# Patient Record
Sex: MALE | Race: WHITE | NOT HISPANIC OR LATINO | Employment: UNEMPLOYED | URBAN - NONMETROPOLITAN AREA
[De-identification: names, ages, dates, MRNs, and addresses within clinical notes are randomized per-mention and may not be internally consistent; named-entity substitution may affect disease eponyms.]

---

## 2018-04-10 ENCOUNTER — HOSPITAL ENCOUNTER (OUTPATIENT)
Dept: GENERAL RADIOLOGY | Facility: HOSPITAL | Age: 63
Discharge: HOME | End: 2018-04-10
Attending: INTERNAL MEDICINE | Admitting: INTERNAL MEDICINE

## 2018-04-10 ENCOUNTER — TRANSCRIBE ORDERS (OUTPATIENT)
Dept: GENERAL RADIOLOGY | Facility: HOSPITAL | Age: 63
End: 2018-04-10

## 2018-04-10 DIAGNOSIS — M25.562 LEFT KNEE PAIN, UNSPECIFIED CHRONICITY: Primary | ICD-10-CM

## 2018-04-10 PROCEDURE — 73560 X-RAY EXAM OF KNEE 1 OR 2: CPT | Performed by: RADIOLOGY

## 2018-04-10 PROCEDURE — 73560 X-RAY EXAM OF KNEE 1 OR 2: CPT

## 2018-08-03 ENCOUNTER — TRANSCRIBE ORDERS (OUTPATIENT)
Dept: MRI IMAGING | Facility: HOSPITAL | Age: 63
End: 2018-08-03

## 2018-08-03 DIAGNOSIS — M25.562 LEFT KNEE PAIN, UNSPECIFIED CHRONICITY: Primary | ICD-10-CM

## 2019-07-05 ENCOUNTER — APPOINTMENT (OUTPATIENT)
Dept: CT IMAGING | Facility: HOSPITAL | Age: 64
End: 2019-07-05

## 2019-07-05 ENCOUNTER — APPOINTMENT (OUTPATIENT)
Dept: GENERAL RADIOLOGY | Facility: HOSPITAL | Age: 64
End: 2019-07-05

## 2019-07-05 ENCOUNTER — HOSPITAL ENCOUNTER (INPATIENT)
Facility: HOSPITAL | Age: 64
LOS: 3 days | Discharge: HOME | End: 2019-07-08
Attending: EMERGENCY MEDICINE | Admitting: INTERNAL MEDICINE

## 2019-07-05 DIAGNOSIS — A48.1 LEGIONELLA PNEUMONIA (HCC): ICD-10-CM

## 2019-07-05 DIAGNOSIS — J18.9 PNEUMONIA OF BOTH LUNGS DUE TO INFECTIOUS ORGANISM, UNSPECIFIED PART OF LUNG: Primary | ICD-10-CM

## 2019-07-05 DIAGNOSIS — A41.9 SEPSIS, DUE TO UNSPECIFIED ORGANISM: ICD-10-CM

## 2019-07-05 DIAGNOSIS — J96.01 ACUTE RESPIRATORY FAILURE WITH HYPOXIA (HCC): ICD-10-CM

## 2019-07-05 DIAGNOSIS — R55 SYNCOPE AND COLLAPSE: ICD-10-CM

## 2019-07-05 PROBLEM — E03.9 HYPOTHYROIDISM: Status: ACTIVE | Noted: 2019-07-05

## 2019-07-05 PROBLEM — E11.9 TYPE 2 DIABETES MELLITUS: Status: ACTIVE | Noted: 2019-07-05

## 2019-07-05 LAB
A-A DO2: 62.8 MMHG (ref 0–300)
ALBUMIN SERPL-MCNC: 2.91 G/DL (ref 3.5–5.2)
ALBUMIN/GLOB SERPL: 0.7 G/DL
ALP SERPL-CCNC: 63 U/L (ref 39–117)
ALT SERPL W P-5'-P-CCNC: 16 U/L (ref 1–41)
ANION GAP SERPL CALCULATED.3IONS-SCNC: 13.8 MMOL/L (ref 5–15)
ARTERIAL PATENCY WRIST A: POSITIVE
AST SERPL-CCNC: 24 U/L (ref 1–40)
ATMOSPHERIC PRESS: 723 MMHG
BACTERIA UR QL AUTO: ABNORMAL /HPF
BASE EXCESS BLDA CALC-SCNC: -3.9 MMOL/L
BASOPHILS # BLD AUTO: 0.03 10*3/MM3 (ref 0–0.2)
BASOPHILS NFR BLD AUTO: 0.3 % (ref 0–1.5)
BDY SITE: ABNORMAL
BILIRUB SERPL-MCNC: 0.3 MG/DL (ref 0.2–1.2)
BILIRUB UR QL STRIP: NEGATIVE
BODY TEMPERATURE: 98.6 C
BUN BLD-MCNC: 23 MG/DL (ref 8–23)
BUN/CREAT SERPL: 12.2 (ref 7–25)
CALCIUM SPEC-SCNC: 9.4 MG/DL (ref 8.6–10.5)
CHLORIDE SERPL-SCNC: 99 MMOL/L (ref 98–107)
CK SERPL-CCNC: 113 U/L (ref 20–200)
CLARITY UR: ABNORMAL
CO2 SERPL-SCNC: 18.2 MMOL/L (ref 22–29)
COHGB MFR BLD: 1.7 % (ref 0–5)
COLOR UR: YELLOW
CREAT BLD-MCNC: 1.89 MG/DL (ref 0.76–1.27)
D-LACTATE SERPL-SCNC: 1.2 MMOL/L (ref 0.5–2)
DEPRECATED RDW RBC AUTO: 50.3 FL (ref 37–54)
EOSINOPHIL # BLD AUTO: 0.18 10*3/MM3 (ref 0–0.4)
EOSINOPHIL NFR BLD AUTO: 1.7 % (ref 0.3–6.2)
ERYTHROCYTE [DISTWIDTH] IN BLOOD BY AUTOMATED COUNT: 14.1 % (ref 12.3–15.4)
GFR SERPL CREATININE-BSD FRML MDRD: 36 ML/MIN/1.73
GLOBULIN UR ELPH-MCNC: 4.2 GM/DL
GLUCOSE BLD-MCNC: 273 MG/DL (ref 65–99)
GLUCOSE UR STRIP-MCNC: ABNORMAL MG/DL
HBA1C MFR BLD: 7.4 % (ref 4.8–5.6)
HCO3 BLDA-SCNC: 19.7 MMOL/L (ref 22–26)
HCT VFR BLD AUTO: 35.4 % (ref 37.5–51)
HCT VFR BLD CALC: 36 % (ref 42–52)
HGB BLD-MCNC: 11.6 G/DL (ref 13–17.7)
HGB BLDA-MCNC: 12.3 G/DL (ref 12–16)
HGB UR QL STRIP.AUTO: ABNORMAL
HOLD SPECIMEN: NORMAL
HOLD SPECIMEN: NORMAL
HOROWITZ INDEX BLD+IHG-RTO: 21 %
HYALINE CASTS UR QL AUTO: ABNORMAL /LPF
IMM GRANULOCYTES # BLD AUTO: 0.17 10*3/MM3 (ref 0–0.05)
IMM GRANULOCYTES NFR BLD AUTO: 1.6 % (ref 0–0.5)
KETONES UR QL STRIP: ABNORMAL
L PNEUMO1 AG UR QL IA: POSITIVE
LEUKOCYTE ESTERASE UR QL STRIP.AUTO: NEGATIVE
LYMPHOCYTES # BLD AUTO: 0.63 10*3/MM3 (ref 0.7–3.1)
LYMPHOCYTES NFR BLD AUTO: 5.8 % (ref 19.6–45.3)
MCH RBC QN AUTO: 32.9 PG (ref 26.6–33)
MCHC RBC AUTO-ENTMCNC: 32.8 G/DL (ref 31.5–35.7)
MCV RBC AUTO: 100.3 FL (ref 79–97)
METHGB BLD QL: 0.3 % (ref 0–3)
MODALITY: ABNORMAL
MONOCYTES # BLD AUTO: 0.75 10*3/MM3 (ref 0.1–0.9)
MONOCYTES NFR BLD AUTO: 6.9 % (ref 5–12)
NEUTROPHILS # BLD AUTO: 9.06 10*3/MM3 (ref 1.7–7)
NEUTROPHILS NFR BLD AUTO: 83.7 % (ref 42.7–76)
NITRITE UR QL STRIP: NEGATIVE
NOTE: ABNORMAL
NT-PROBNP SERPL-MCNC: 501.7 PG/ML (ref 5–900)
OXYHGB MFR BLDV: 77.6 % (ref 85–100)
PCO2 BLDA: 31.5 MM HG (ref 35–45)
PCO2 TEMP ADJ BLD: ABNORMAL MM HG (ref 35–48)
PH BLDA: 7.41 PH UNITS (ref 7.35–7.45)
PH UR STRIP.AUTO: 5.5 [PH] (ref 5–8)
PH, TEMP CORRECTED: ABNORMAL PH UNITS (ref 7.35–7.45)
PLATELET # BLD AUTO: 203 10*3/MM3 (ref 140–450)
PMV BLD AUTO: 10.2 FL (ref 6–12)
PO2 BLDA: 41.4 MM HG (ref 80–100)
PO2 TEMP ADJ BLD: ABNORMAL MM HG (ref 83–108)
POTASSIUM BLD-SCNC: 5.1 MMOL/L (ref 3.5–5.2)
PROT SERPL-MCNC: 7.1 G/DL (ref 6–8.5)
PROT UR QL STRIP: ABNORMAL
RBC # BLD AUTO: 3.53 10*6/MM3 (ref 4.14–5.8)
RBC # UR: ABNORMAL /HPF
REF LAB TEST METHOD: ABNORMAL
SAO2 % BLDCOA: 79.2 % (ref 90–100)
SODIUM BLD-SCNC: 131 MMOL/L (ref 136–145)
SP GR UR STRIP: 1.02 (ref 1–1.03)
SQUAMOUS #/AREA URNS HPF: ABNORMAL /HPF
TROPONIN T SERPL-MCNC: <0.01 NG/ML (ref 0–0.03)
TSH SERPL DL<=0.05 MIU/L-ACNC: 0.29 MIU/ML (ref 0.27–4.2)
UROBILINOGEN UR QL STRIP: ABNORMAL
WBC NRBC COR # BLD: 10.82 10*3/MM3 (ref 3.4–10.8)
WBC UR QL AUTO: ABNORMAL /HPF
WHOLE BLOOD HOLD SPECIMEN: NORMAL
WHOLE BLOOD HOLD SPECIMEN: NORMAL

## 2019-07-05 PROCEDURE — 25010000002 CEFTRIAXONE: Performed by: EMERGENCY MEDICINE

## 2019-07-05 PROCEDURE — 84484 ASSAY OF TROPONIN QUANT: CPT | Performed by: EMERGENCY MEDICINE

## 2019-07-05 PROCEDURE — 84443 ASSAY THYROID STIM HORMONE: CPT | Performed by: INTERNAL MEDICINE

## 2019-07-05 PROCEDURE — 70450 CT HEAD/BRAIN W/O DYE: CPT | Performed by: RADIOLOGY

## 2019-07-05 PROCEDURE — 82550 ASSAY OF CK (CPK): CPT | Performed by: EMERGENCY MEDICINE

## 2019-07-05 PROCEDURE — 82805 BLOOD GASES W/O2 SATURATION: CPT | Performed by: EMERGENCY MEDICINE

## 2019-07-05 PROCEDURE — 83880 ASSAY OF NATRIURETIC PEPTIDE: CPT | Performed by: EMERGENCY MEDICINE

## 2019-07-05 PROCEDURE — 71045 X-RAY EXAM CHEST 1 VIEW: CPT

## 2019-07-05 PROCEDURE — 87899 AGENT NOS ASSAY W/OPTIC: CPT | Performed by: EMERGENCY MEDICINE

## 2019-07-05 PROCEDURE — 70450 CT HEAD/BRAIN W/O DYE: CPT

## 2019-07-05 PROCEDURE — 93005 ELECTROCARDIOGRAM TRACING: CPT | Performed by: EMERGENCY MEDICINE

## 2019-07-05 PROCEDURE — 83050 HGB METHEMOGLOBIN QUAN: CPT | Performed by: EMERGENCY MEDICINE

## 2019-07-05 PROCEDURE — 85025 COMPLETE CBC W/AUTO DIFF WBC: CPT | Performed by: EMERGENCY MEDICINE

## 2019-07-05 PROCEDURE — 83036 HEMOGLOBIN GLYCOSYLATED A1C: CPT | Performed by: INTERNAL MEDICINE

## 2019-07-05 PROCEDURE — 81001 URINALYSIS AUTO W/SCOPE: CPT | Performed by: EMERGENCY MEDICINE

## 2019-07-05 PROCEDURE — 71045 X-RAY EXAM CHEST 1 VIEW: CPT | Performed by: RADIOLOGY

## 2019-07-05 PROCEDURE — 36600 WITHDRAWAL OF ARTERIAL BLOOD: CPT | Performed by: EMERGENCY MEDICINE

## 2019-07-05 PROCEDURE — 93010 ELECTROCARDIOGRAM REPORT: CPT | Performed by: INTERNAL MEDICINE

## 2019-07-05 PROCEDURE — 87040 BLOOD CULTURE FOR BACTERIA: CPT | Performed by: EMERGENCY MEDICINE

## 2019-07-05 PROCEDURE — 99285 EMERGENCY DEPT VISIT HI MDM: CPT

## 2019-07-05 PROCEDURE — 80053 COMPREHEN METABOLIC PANEL: CPT | Performed by: EMERGENCY MEDICINE

## 2019-07-05 PROCEDURE — 83605 ASSAY OF LACTIC ACID: CPT | Performed by: EMERGENCY MEDICINE

## 2019-07-05 PROCEDURE — 25010000002 AZITHROMYCIN: Performed by: EMERGENCY MEDICINE

## 2019-07-05 PROCEDURE — 82375 ASSAY CARBOXYHB QUANT: CPT | Performed by: EMERGENCY MEDICINE

## 2019-07-05 RX ORDER — DEXTROSE MONOHYDRATE 25 G/50ML
25 INJECTION, SOLUTION INTRAVENOUS
Status: DISCONTINUED | OUTPATIENT
Start: 2019-07-05 | End: 2019-07-08 | Stop reason: HOSPADM

## 2019-07-05 RX ORDER — ACETAMINOPHEN 500 MG
1000 TABLET ORAL ONCE
Status: COMPLETED | OUTPATIENT
Start: 2019-07-05 | End: 2019-07-05

## 2019-07-05 RX ORDER — HYDROCODONE BITARTRATE AND ACETAMINOPHEN 10; 325 MG/1; MG/1
1 TABLET ORAL EVERY 12 HOURS PRN
COMMUNITY

## 2019-07-05 RX ORDER — SODIUM CHLORIDE 0.9 % (FLUSH) 0.9 %
10 SYRINGE (ML) INJECTION AS NEEDED
Status: DISCONTINUED | OUTPATIENT
Start: 2019-07-05 | End: 2019-07-08 | Stop reason: HOSPADM

## 2019-07-05 RX ORDER — LEVOTHYROXINE SODIUM 0.15 MG/1
150 TABLET ORAL DAILY
COMMUNITY

## 2019-07-05 RX ORDER — NICOTINE POLACRILEX 4 MG
15 LOZENGE BUCCAL
Status: DISCONTINUED | OUTPATIENT
Start: 2019-07-05 | End: 2019-07-08 | Stop reason: HOSPADM

## 2019-07-05 RX ORDER — PIOGLITAZONEHYDROCHLORIDE 30 MG/1
30 TABLET ORAL DAILY
COMMUNITY
End: 2019-07-08 | Stop reason: HOSPADM

## 2019-07-05 RX ORDER — CHOLECALCIFEROL (VITAMIN D3) 125 MCG
2000 CAPSULE ORAL DAILY
COMMUNITY
End: 2020-03-25

## 2019-07-05 RX ADMIN — ACETAMINOPHEN 1000 MG: 500 TABLET ORAL at 18:59

## 2019-07-05 RX ADMIN — SODIUM CHLORIDE 1000 ML: 9 INJECTION, SOLUTION INTRAVENOUS at 18:59

## 2019-07-05 RX ADMIN — CEFTRIAXONE 1 G: 1 INJECTION, POWDER, FOR SOLUTION INTRAMUSCULAR; INTRAVENOUS at 19:35

## 2019-07-05 RX ADMIN — AZITHROMYCIN MONOHYDRATE 500 MG: 500 INJECTION, POWDER, LYOPHILIZED, FOR SOLUTION INTRAVENOUS at 20:57

## 2019-07-05 RX ADMIN — SODIUM CHLORIDE 1000 ML: 9 INJECTION, SOLUTION INTRAVENOUS at 19:35

## 2019-07-05 NOTE — ED PROVIDER NOTES
"Subjective   63-year-old male presents with fever and a syncopal event.  He states he has been feeling \"sick\" all week with fluctuating fevers.  He has had some mild cough but otherwise has just felt fatigued.  He denies chest pain or shortness of breath.  Apparently he was sitting on the couch and had a brief syncopal event.  No seizure activity.  Came to quickly and was transported to the ER.  On arrival he was markedly febrile and hypoxic.        History provided by:  Patient and friend   used: No        Review of Systems   Constitutional: Positive for fever.   HENT: Negative.    Eyes: Negative.    Respiratory: Positive for cough. Negative for shortness of breath.    Cardiovascular: Negative.  Negative for chest pain.   Gastrointestinal: Negative.  Negative for abdominal pain.   Genitourinary: Negative.  Negative for dysuria.   Musculoskeletal: Negative.    Skin: Negative.    Neurological: Positive for syncope. Negative for headaches.   Psychiatric/Behavioral: Negative.    All other systems reviewed and are negative.      No past medical history on file.    No Known Allergies    No past surgical history on file.    No family history on file.    Social History     Socioeconomic History   • Marital status:      Spouse name: Not on file   • Number of children: Not on file   • Years of education: Not on file   • Highest education level: Not on file           Objective   Physical Exam   Constitutional: He is oriented to person, place, and time. He appears well-developed and well-nourished. He appears distressed.   HENT:   Head: Normocephalic and atraumatic.   Right Ear: External ear normal.   Left Ear: External ear normal.   Nose: Nose normal.   Eyes: Conjunctivae and EOM are normal. Pupils are equal, round, and reactive to light.   Neck: Normal range of motion. Neck supple. No JVD present. No tracheal deviation present.   Cardiovascular: Regular rhythm, normal heart sounds and intact " distal pulses. Tachycardia present.   No murmur heard.  Pulmonary/Chest: Effort normal. No respiratory distress. He has no wheezes.   Coarse, diminished on the left   Abdominal: Soft. Bowel sounds are normal. There is no tenderness.   Musculoskeletal: Normal range of motion. He exhibits no edema or deformity.   Neurological: He is alert and oriented to person, place, and time. He has normal strength. No cranial nerve deficit or sensory deficit. Coordination normal.   Skin: Skin is warm and dry. No rash noted. No erythema. No pallor.   Psychiatric: He has a normal mood and affect. His behavior is normal. Thought content normal.   Nursing note and vitals reviewed.      Procedures         EKG - 118, sinus tachy, nl intervals, no ischemic changes  Chest x-ray with diffuse bilateral hazy infiltrates consistent with pneumonia on my read    ED Course                  MDM  Number of Diagnoses or Management Options  Acute respiratory failure with hypoxia (CMS/HCC):   Legionella pneumonia (CMS/HCC):   Pneumonia of both lungs due to infectious organism, unspecified part of lung:   Sepsis, due to unspecified organism (CMS/HCC):   Syncope and collapse:   Diagnosis management comments: 63-year-old male presents complaining of fever, cough, weakness.  Found to be septic from Legionella pneumonia.  He has had improvement with IV fluids and antipyretics.  He was hypoxic but has improved on 4L by NC, satting in the low 90s now.  Breathing comfortably.  He has been treated for community-acquired pneumonia with Rocephin and azithromycin.  Interestingly his Legionella urinary antigen returned positive so this probably explains his symptoms.  He will certainly require admission.  Discussed with hospitalist and accepted to their service.       Amount and/or Complexity of Data Reviewed  Clinical lab tests: ordered and reviewed  Tests in the radiology section of CPT®: ordered and reviewed  Review and summarize past medical records:  yes  Independent visualization of images, tracings, or specimens: yes          Final diagnoses:   Pneumonia of both lungs due to infectious organism, unspecified part of lung   Sepsis, due to unspecified organism (CMS/[facility])   Syncope and collapse   Acute respiratory failure with hypoxia (CMS/[facility])   Legionella pneumonia (CMS/[facility])            Jabier Esparza MD  07/05/19 2381

## 2019-07-05 NOTE — ED NOTES
"Patient states that he is has been \"feeling bad\" off and on for a few days. He reports that he is unsure if he has been running a temperature. Oxygen level low, patient reports that he does not wear oxygen at home. Family told EMS that he had a syncopal episode, girlfriend reports that patient just slumped over on the couch.       Carlton Granados RN  07/05/19 4821    "

## 2019-07-06 LAB
GLUCOSE BLDC GLUCOMTR-MCNC: 269 MG/DL (ref 70–130)
GLUCOSE BLDC GLUCOMTR-MCNC: 279 MG/DL (ref 70–130)
GLUCOSE BLDC GLUCOMTR-MCNC: 369 MG/DL (ref 70–130)
GLUCOSE BLDC GLUCOMTR-MCNC: 374 MG/DL (ref 70–130)
S PNEUM AG SPEC QL LA: NEGATIVE

## 2019-07-06 PROCEDURE — 25010000002 CEFTRIAXONE: Performed by: INTERNAL MEDICINE

## 2019-07-06 PROCEDURE — 94799 UNLISTED PULMONARY SVC/PX: CPT

## 2019-07-06 PROCEDURE — 92610 EVALUATE SWALLOWING FUNCTION: CPT

## 2019-07-06 PROCEDURE — 99223 1ST HOSP IP/OBS HIGH 75: CPT | Performed by: INTERNAL MEDICINE

## 2019-07-06 PROCEDURE — 82962 GLUCOSE BLOOD TEST: CPT

## 2019-07-06 PROCEDURE — 25010000002 HEPARIN (PORCINE) PER 1000 UNITS: Performed by: INTERNAL MEDICINE

## 2019-07-06 PROCEDURE — 25010000002 AZITHROMYCIN: Performed by: INTERNAL MEDICINE

## 2019-07-06 PROCEDURE — 63710000001 DIPHENHYDRAMINE PER 50 MG: Performed by: INTERNAL MEDICINE

## 2019-07-06 PROCEDURE — 63710000001 INSULIN ASPART PER 5 UNITS: Performed by: INTERNAL MEDICINE

## 2019-07-06 RX ORDER — DEXTROSE MONOHYDRATE 25 G/50ML
25 INJECTION, SOLUTION INTRAVENOUS
Status: DISCONTINUED | OUTPATIENT
Start: 2019-07-06 | End: 2019-07-08 | Stop reason: HOSPADM

## 2019-07-06 RX ORDER — SODIUM CHLORIDE 0.9 % (FLUSH) 0.9 %
3 SYRINGE (ML) INJECTION EVERY 12 HOURS SCHEDULED
Status: DISCONTINUED | OUTPATIENT
Start: 2019-07-06 | End: 2019-07-08 | Stop reason: HOSPADM

## 2019-07-06 RX ORDER — PIOGLITAZONEHYDROCHLORIDE 15 MG/1
30 TABLET ORAL DAILY
Status: CANCELLED | OUTPATIENT
Start: 2019-07-06

## 2019-07-06 RX ORDER — ACETAMINOPHEN 325 MG/1
650 TABLET ORAL ONCE
Status: COMPLETED | OUTPATIENT
Start: 2019-07-06 | End: 2019-07-06

## 2019-07-06 RX ORDER — NITROGLYCERIN 0.4 MG/1
0.4 TABLET SUBLINGUAL
Status: DISCONTINUED | OUTPATIENT
Start: 2019-07-06 | End: 2019-07-08 | Stop reason: HOSPADM

## 2019-07-06 RX ORDER — HYDROCODONE BITARTRATE AND ACETAMINOPHEN 10; 325 MG/1; MG/1
1 TABLET ORAL EVERY 12 HOURS PRN
Status: DISCONTINUED | OUTPATIENT
Start: 2019-07-06 | End: 2019-07-08 | Stop reason: HOSPADM

## 2019-07-06 RX ORDER — SODIUM CHLORIDE 0.9 % (FLUSH) 0.9 %
3-10 SYRINGE (ML) INJECTION AS NEEDED
Status: DISCONTINUED | OUTPATIENT
Start: 2019-07-06 | End: 2019-07-08 | Stop reason: HOSPADM

## 2019-07-06 RX ORDER — NICOTINE POLACRILEX 4 MG
15 LOZENGE BUCCAL
Status: DISCONTINUED | OUTPATIENT
Start: 2019-07-06 | End: 2019-07-08 | Stop reason: HOSPADM

## 2019-07-06 RX ORDER — DIPHENHYDRAMINE HCL 25 MG
25 CAPSULE ORAL NIGHTLY PRN
Status: DISCONTINUED | OUTPATIENT
Start: 2019-07-06 | End: 2019-07-08 | Stop reason: HOSPADM

## 2019-07-06 RX ORDER — HEPARIN SODIUM 5000 [USP'U]/ML
5000 INJECTION, SOLUTION INTRAVENOUS; SUBCUTANEOUS EVERY 12 HOURS SCHEDULED
Status: DISCONTINUED | OUTPATIENT
Start: 2019-07-06 | End: 2019-07-08 | Stop reason: HOSPADM

## 2019-07-06 RX ORDER — ACETAMINOPHEN 325 MG/1
650 TABLET ORAL EVERY 6 HOURS PRN
Status: DISCONTINUED | OUTPATIENT
Start: 2019-07-06 | End: 2019-07-08 | Stop reason: HOSPADM

## 2019-07-06 RX ORDER — OMEGA-3S/DHA/EPA/FISH OIL/D3 300MG-1000
2000 CAPSULE ORAL DAILY
Status: DISCONTINUED | OUTPATIENT
Start: 2019-07-06 | End: 2019-07-08 | Stop reason: HOSPADM

## 2019-07-06 RX ORDER — LEVOTHYROXINE SODIUM 0.15 MG/1
150 TABLET ORAL DAILY
Status: DISCONTINUED | OUTPATIENT
Start: 2019-07-06 | End: 2019-07-08 | Stop reason: HOSPADM

## 2019-07-06 RX ADMIN — ACETAMINOPHEN 650 MG: 325 TABLET ORAL at 05:39

## 2019-07-06 RX ADMIN — ACETAMINOPHEN 650 MG: 325 TABLET ORAL at 15:38

## 2019-07-06 RX ADMIN — INSULIN ASPART 4 UNITS: 100 INJECTION, SOLUTION INTRAVENOUS; SUBCUTANEOUS at 12:03

## 2019-07-06 RX ADMIN — AZITHROMYCIN MONOHYDRATE 500 MG: 500 INJECTION, POWDER, LYOPHILIZED, FOR SOLUTION INTRAVENOUS at 20:14

## 2019-07-06 RX ADMIN — SODIUM CHLORIDE, PRESERVATIVE FREE 3 ML: 5 INJECTION INTRAVENOUS at 08:43

## 2019-07-06 RX ADMIN — CHOLECALCIFEROL TAB 10 MCG (400 UNIT) 2000 UNITS: 10 TAB at 10:22

## 2019-07-06 RX ADMIN — SODIUM CHLORIDE, PRESERVATIVE FREE 3 ML: 5 INJECTION INTRAVENOUS at 08:44

## 2019-07-06 RX ADMIN — SODIUM CHLORIDE, PRESERVATIVE FREE 3 ML: 5 INJECTION INTRAVENOUS at 20:15

## 2019-07-06 RX ADMIN — CEFTRIAXONE 2 G: 2 INJECTION, POWDER, FOR SOLUTION INTRAMUSCULAR; INTRAVENOUS at 08:43

## 2019-07-06 RX ADMIN — SODIUM CHLORIDE, PRESERVATIVE FREE 3 ML: 5 INJECTION INTRAVENOUS at 03:32

## 2019-07-06 RX ADMIN — HEPARIN SODIUM 5000 UNITS: 5000 INJECTION INTRAVENOUS; SUBCUTANEOUS at 03:31

## 2019-07-06 RX ADMIN — LEVOTHYROXINE SODIUM 150 MCG: 150 TABLET ORAL at 10:22

## 2019-07-06 RX ADMIN — HEPARIN SODIUM 5000 UNITS: 5000 INJECTION INTRAVENOUS; SUBCUTANEOUS at 20:14

## 2019-07-06 RX ADMIN — INSULIN ASPART 6 UNITS: 100 INJECTION, SOLUTION INTRAVENOUS; SUBCUTANEOUS at 16:39

## 2019-07-06 RX ADMIN — DIPHENHYDRAMINE HYDROCHLORIDE 25 MG: 25 CAPSULE ORAL at 17:46

## 2019-07-06 RX ADMIN — HEPARIN SODIUM 5000 UNITS: 5000 INJECTION INTRAVENOUS; SUBCUTANEOUS at 08:42

## 2019-07-06 RX ADMIN — INSULIN ASPART 6 UNITS: 100 INJECTION, SOLUTION INTRAVENOUS; SUBCUTANEOUS at 20:14

## 2019-07-06 NOTE — PROGRESS NOTES
[facility] HOSPITALIST PROGRESS NOTE     Patient Identification:  Name:  Jabier Gonsalves  Age:  63 y.o.  Sex:  male  :  1955  MRN:  [id number]  Visit Number:  [id number]  Primary Care Provider:  Laueren Bernard MD    Length of stay:  1    Chief complaint: Follow-up for syncope and community acquired pneumonia        Subjective:    Patient seen with nursing staff.  Family present at bedside.  Patient states that he is feeling much better since last night.  Patient is having some cough but denies any sputum production.  He denied any nausea or vomiting.  Patient states that his breathing is much improved.  He states that he was not able to walk before he came to the emergency department and was passing out but has been able to walk to the bedside commode.          Current Hospital Meds:    azithromycin 500 mg Intravenous Q24H   ceftriaxone 2 g Intravenous Q24H   cholecalciferol 2,000 Units Oral Daily   heparin (porcine) 5,000 Units Subcutaneous Q12H   levothyroxine 150 mcg Oral Daily   sodium chloride 3 mL Intravenous Q12H   sodium chloride 3 mL Intravenous Q12H        ----------------------------------------------------------------------------------------------------------------------  Vital Signs:  Temp:  [100.1 °F (37.8 °C)-102.6 °F (39.2 °C)] 102.4 °F (39.1 °C)  Heart Rate:  [] 94  Resp:  [18-22] 18  BP: ()/() 155/88      19  0106 19  0154 19  0605   Weight: 94.8 kg (208 lb 14.4 oz) 94.8 kg (208 lb 14.4 oz) 94.8 kg (208 lb 14.4 oz)     Body mass index is 28.33 kg/m².    Intake/Output Summary (Last 24 hours) at 2019 1058  Last data filed at 2019 0605  Gross per 24 hour   Intake --   Output 1075 ml   Net -1075 ml     NPO Diet  ----------------------------------------------------------------------------------------------------------------------  Physical exam:  Constitutional:  Well-developed and well-nourished.   Lying comfortably in bed.  No acute  distress.  HENT:  Head:  Normocephalic and atraumatic.  Mouth:  Moist mucous membranes.    Eyes:  Conjunctivae and EOM are normal.  Pupils are equal, round, and reactive to light.   Neck:  Neck supple.  No JVD present.    Cardiovascular:  Regular rate and rhythm. S1+S2. No murmur, rubs or gallops.   Pulmonary/Chest: Right upper and mid zones rhonchi and few crackles in the left basilar area.  Abdominal:  Soft. Non-tender. No viscera palpable.  Bowel sounds audible.   Musculoskeletal: No deformity or joint swelling.   Peripheral vascular: Bilateral dorsalis pedis palpable. No edema.   Neurological:  Alert and oriented to person, place, and time.  Cranial nerves grossly intact. Strength bilaterally symmetrical in upper and lower extremities.   Skin:  Skin is warm and dry. No rash noted. No pallor.   ----------------------------------------------------------------------------------------------------------------------  Tele: Sinus tachycardia  ----------------------------------------------------------------------------------------------------------------------  Results from last 7 days   Lab Units 07/05/19  1837   CK TOTAL U/L 113   TROPONIN T ng/mL <0.010     Results from last 7 days   Lab Units 07/05/19  1837   LACTATE mmol/L 1.2   WBC 10*3/mm3 10.82*   HEMOGLOBIN g/dL 11.6*   HEMATOCRIT % 35.4*   MCV fL 100.3*   MCHC g/dL 32.8   PLATELETS 10*3/mm3 203     Results from last 7 days   Lab Units 07/05/19  1829   PH, ARTERIAL pH units 7.414   PO2 ART mm Hg 41.4*   PCO2, ARTERIAL mm Hg 31.5*   HCO3 ART mmol/L 19.7*     Results from last 7 days   Lab Units 07/05/19  1837   SODIUM mmol/L 131*   POTASSIUM mmol/L 5.1   CHLORIDE mmol/L 99   CO2 mmol/L 18.2*   BUN mg/dL 23   CREATININE mg/dL 1.89*   EGFR IF NONAFRICN AM mL/min/1.73 36*   CALCIUM mg/dL 9.4   GLUCOSE mg/dL 273*   ALBUMIN g/dL 2.91*   BILIRUBIN mg/dL 0.3   ALK PHOS U/L 63   AST (SGOT) U/L 24   ALT (SGPT) U/L 16   Estimated Creatinine Clearance: 49.1 mL/min (A) (by  C-G formula based on SCr of 1.89 mg/dL (H)).    No results found for: AMMONIA                    I have personally looked at the labs and they are summarized above.  ----------------------------------------------------------------------------------------------------------------------  Imaging Results (last 24 hours)     Procedure Component Value Units Date/Time    XR Chest 1 View [[id number]] Collected:  07/06/19 1012     Updated:  07/06/19 1015    Narrative:       XR CHEST 1 VW-     CLINICAL INDICATION: Simple Sepsis Triage Protocol        COMPARISON: 05/20/2015      TECHNIQUE: Single frontal view of the chest.     FINDINGS:     Extensive left-sided airspace disease  The cardiac silhouette is normal. The pulmonary vasculature is  unremarkable.  There is no evidence of an acute osseous abnormality.   There are no suspicious-appearing parenchymal soft tissue nodules.          Impression:       Left-sided consolidation     This report was finalized on 7/6/2019 10:12 AM by Dr. Hung Lowe MD.       CT Head Without Contrast [[id number]] Collected:  07/06/19 1012     Updated:  07/06/19 1014    Narrative:       CT HEAD WO CONTRAST-     CLINICAL INDICATION: syncope        COMPARISON: None available      TECHNIQUE: Axial images of the brain were obtained with out intravenous  contrast.  Reformatted images were created in the sagittal and coronal  planes.     DOSE: 1147.50 mGy.cm     Radiation dose reduction techniques were utilized per ALARA protocol.  Automated exposure control was initiated through either or [employer] or  DoseRight software packages by  protocol.           FINDINGS:   Today's study shows no mass, hemorrhage, or midline shift.   The ventricles, cisterns, and sulci are unremarkable. There is no  hydrocephalus.   There is no evidence of acute ischemia.  I do not see epidural or subdural hematoma.  The gray-white differentiation is appropriate.   The bone window setting images show no destructive  calvarial lesion or  acute calvarial fracture.   The posterior fossa is unremarkable.          Impression:       No acute intracranial pathology. Nothing is seen on this exam to  specifically account for the patient's symptoms.     This report was finalized on 7/6/2019 10:12 AM by Dr. Hung Lowe MD.           ----------------------------------------------------------------------------------------------------------------------  Assessment and Plan:      -Sepsis due to pneumonia  -Community-acquired pneumonia due to Legionella, chest x-ray showing left basilar consolidation  -Syncope, improved  -Acute hypoxemic respiratory failure due to pneumonia, improved  -Acute kidney injury  -Pseudohyponatremia  -Hyperglycemia  -Diabetes mellitus  -Hypothyroidism    Patient continues to have fever with temperature of 102.4 earlier this morning.  He continues to have tachycardia and hypoxemia.  Urine Legionella antigen is positive.  We will continue patient on Rocephin and azithromycin.  Continue to follow up on culture results and continue patient on IV fluids and supplemental oxygen as needed.  Will start patient on nebulized bronchodilators and Pulmicort nebs.  Chest x-ray showed left-sided consolidation.  No growth on blood culture so far.  Patient has had swallow eval and will start him on regular diet.  Patient has history of throat cancer and radiation causing globus sensation.  No evidence of aspiration.  Creatinine is slightly elevated from baseline at 1.8 and will keep patient on IV fluids.  He has history of renal cell carcinoma and right nephrectomy in the past.    CT scan of the head showed no acute intracranial pathology.  I will check carotid Doppler study to rule out carotid artery stenosis causing his syncope and 2D echocardiogram.    Start patient on Accu-Cheks AC at bedtime and coverage with sliding scale insulin.    Continue heparin for DVT prophylaxis.  Transfer patient out of the intensive care  unit.    The patient is high risk due to: Syncope, community acquired pneumonia due to Legionella, hypoxemia, hyperglycemia,    I discussed the patients findings and my recommendations with patient, family and nursing staff.    Nikky Flynn MD  07/06/19  10:58 AM

## 2019-07-06 NOTE — PLAN OF CARE
Problem: Patient Care Overview  Goal: Plan of Care Review  Outcome: Ongoing (interventions implemented as appropriate)      Problem: Fall Risk (Adult)  Goal: Identify Related Risk Factors and Signs and Symptoms  Outcome: Ongoing (interventions implemented as appropriate)      Problem: Infection, Risk/Actual (Adult)  Goal: Identify Related Risk Factors and Signs and Symptoms  Outcome: Ongoing (interventions implemented as appropriate)

## 2019-07-06 NOTE — H&P
"    [facility] HOSPITALIST HISTORY AND PHYSICAL    Patient Identification:  Name:  Jabier Gonsalves  Age:  63 y.o.  Sex:  male  :  1955  MRN:  [id number]   Visit Number:  [id number]  Room number:  CC04/1C  Primary Care Physician:  Laureen Bernard MD     Subjective     Chief complaint:    Chief Complaint   Patient presents with   • Syncope       History of presenting illness:  63 y.o. male who presented to [facility] emergency department with abdominal pain.  Patient states that the abdominal pain started 5 days prior to admission.  It started in his umbilical region and felt like he was \"hungry\".  He denied nausea, denied vomiting, and denies diarrhea.  Patient did notice that he had some fevers up to 102 at home.  He does have a chronic cough at baseline and this is not been worsened recently.  The patient states he does choke on his food ever since he had the radiation for his throat cancer.  He also states that he has been cleaning out his swimming pool.  In the emergency department, the patient was found to be Legionella positive.  He had bilateral infiltrates on chest x-ray.  He was also septic and in kidney failure.  As result, the patient was admitted to the critical care unit as he was requiring at least 4 L of oxygen and he does not utilize any oxygen at home.  ---------------------------------------------------------------------------------------------------------------------   Review of Systems   Constitutional: Positive for fatigue and fever. Negative for chills.   HENT: Negative for postnasal drip, rhinorrhea, sneezing and sore throat.    Eyes: Negative for discharge and redness.   Respiratory: Positive for cough (but not worse than his normal cough). Negative for shortness of breath and wheezing.    Cardiovascular: Negative for chest pain, palpitations and leg swelling.   Gastrointestinal: Positive for abdominal pain. Negative for abdominal distention, blood in stool, " diarrhea, nausea and vomiting.   Genitourinary: Negative for decreased urine volume, dysuria and hematuria.   Musculoskeletal: Negative for arthralgias and joint swelling.   Skin: Negative for pallor, rash and wound.   Neurological: Negative for seizures, speech difficulty and headaches.   Hematological: Does not bruise/bleed easily.   Psychiatric/Behavioral: Negative for confusion, self-injury and suicidal ideas. The patient is not nervous/anxious.      ---------------------------------------------------------------------------------------------------------------------   Past Medical History:   Diagnosis Date   • Hypothyroidism    • Renal cell cancer, right (CMS/[facility])     Required nephrectomy   • Throat cancer (CMS/[facility])     Received radiation treatment   • Type 2 diabetes mellitus (CMS/[facility])      Past Surgical History:   Procedure Laterality Date   • BRONCHOSCOPY     • HERNIA REPAIR     • LYMPH NODE DISSECTION  2015    Right axillary area   • NEPHRECTOMY RADICAL Right     Due to RCC   • TONSILLECTOMY       Family History   Problem Relation Age of Onset   • Cancer Mother         Lung cancer;  at age 80 yo   • No Known Problems Father      Social History     Socioeconomic History   • Marital status:    Tobacco Use   • Smoking status: Former Smoker     Packs/day: 2.00     Years: 32.00     Pack years: 64.00     Types: Cigarettes     Start date:      Last attempt to quit: 10/2007     Years since quittin.7   Substance and Sexual Activity   • Sexual activity: Defer     ---------------------------------------------------------------------------------------------------------------------   Allergies:  Patient has no known allergies.  ---------------------------------------------------------------------------------------------------------------------   Medications below are reported home medications pulling from within the system; at this time, these medications have not been  reconciled unless otherwise specified and are in the verification process for further verifcation as current home medications.    Prior to Admission Medications     Prescriptions Last Dose Informant Patient Reported? Taking?    Cholecalciferol (VITAMIN D3) 2000 units tablet 7/5/2019  Yes Yes    Take 2,000 Units by mouth Daily.    HYDROcodone-acetaminophen (NORCO)  MG per tablet 7/5/2019  Yes Yes    Take 1 tablet by mouth Every 12 (Twelve) Hours As Needed for Moderate Pain .    levothyroxine (SYNTHROID, LEVOTHROID) 150 MCG tablet 7/5/2019  Yes Yes    Take 150 mcg by mouth Daily.    pioglitazone (ACTOS) 30 MG tablet 7/5/2019  Yes Yes    Take 30 mg by mouth Daily.        Objective     Vital Signs:  Temp:  [100.1 °F (37.8 °C)-102.6 °F (39.2 °C)] 102.4 °F (39.1 °C)  Heart Rate:  [100-130] 106  Resp:  [18-22] 20  BP: ()/() 135/72    Mean Arterial Pressure (Non-Invasive) for the past 24 hrs (Last 3 readings):   Noninvasive MAP (mmHg)   07/06/19 0600 95   07/06/19 0535 103   07/06/19 0500 91     SpO2:  [79 %-98 %] 93 %  on  Flow (L/min):  [3-6] 6;   Device (Oxygen Therapy): nasal cannula  Body mass index is 28.33 kg/m².    Wt Readings from Last 3 Encounters:   07/06/19 94.8 kg (208 lb 14.4 oz)      ---------------------------------------------------------------------------------------------------------------------   Physical Exam:  Constitutional:  Well-developed and well-nourished.  Moderate to severe respiratory distress.      HENT:  Head: Normocephalic and atraumatic.  Mouth:  Moist mucous membranes.    Eyes:  Conjunctivae and EOM are normal.  Pupils are equal, round, and reactive to light.  No scleral icterus.  Neck:  Neck supple.  No JVD present.    Cardiovascular:  Normal rate, regular rhythm and normal heart sounds with no murmur.  Pulmonary/Chest: Moderate to severe respiratory distress, bilateral wheezes, very faint bilateral crackles, with very diminished breath sounds and poor air  movement.  Abdominal:  Soft.  Bowel sounds are normal.  No distension and no tenderness.   Musculoskeletal:  No edema, no tenderness, and no deformity.  No red or swollen joints anywhere.    Neurological:  Alert and oriented to person, place, and time.  No cranial nerve deficit.  No tongue deviation.  No facial droop.  No slurred speech.   Skin:  Skin is warm and dry.  No rash noted.  No pallor.   Peripheral vascular:  No edema and strong pulses on all 4 extremities.  Genitourinary: No Mora catheter in place.  ---------------------------------------------------------------------------------------------------------------------  EKG: Sinus tachycardia with a heart rate of 118, QTc 440 ms, no ischemic changes.    Telemetry: Sinus tachycardia with heart rates 90s to 100.    I have personally looked at both the EKG and the telemetry strips.    Last echocardiogram:  None  --------------------------------------------------------------------------------------------------------------------  Labs:  Results from last 7 days   Lab Units 07/05/19  1837   LACTATE mmol/L 1.2   WBC 10*3/mm3 10.82*   HEMOGLOBIN g/dL 11.6*   HEMATOCRIT % 35.4*   MCV fL 100.3*   MCHC g/dL 32.8   PLATELETS 10*3/mm3 203     Results from last 7 days   Lab Units 07/05/19  1829   PH, ARTERIAL pH units 7.414   PO2 ART mm Hg 41.4*   PCO2, ARTERIAL mm Hg 31.5*   HCO3 ART mmol/L 19.7*     Results from last 7 days   Lab Units 07/05/19  1837   SODIUM mmol/L 131*   POTASSIUM mmol/L 5.1   CHLORIDE mmol/L 99   CO2 mmol/L 18.2*   BUN mg/dL 23   CREATININE mg/dL 1.89*   EGFR IF NONAFRICN AM mL/min/1.73 36*   CALCIUM mg/dL 9.4   GLUCOSE mg/dL 273*   ALBUMIN g/dL 2.91*   BILIRUBIN mg/dL 0.3   ALK PHOS U/L 63   AST (SGOT) U/L 24   ALT (SGPT) U/L 16   Estimated Creatinine Clearance: 49.1 mL/min (A) (by C-G formula based on SCr of 1.89 mg/dL (H)).    Results from last 7 days   Lab Units 07/05/19  1837   CK TOTAL U/L 113   TROPONIN T ng/mL <0.010   PROBNP pg/mL 501.7          Hemoglobin A1C   Date/Time Value Ref Range Status   07/05/2019 1837 7.40 (H) 4.80 - 5.60 % Final     Glucose   Date/Time Value Ref Range Status   07/06/2019 0540 279 (H) 70 - 130 mg/dL Final     Lab Results   Component Value Date    TSH 0.291 07/05/2019          I have personally looked at the labs and they are summarized above.  ----------------------------------------------------------------------------------------------------------------------  Detailed radiology reports for the last 24 hours:    Imaging Results (last 24 hours)     Procedure Component Value Units Date/Time    CT Head Without Contrast [[id number]]       Updated:  07/05/19 1933    XR Chest 1 View [[id number]]:  Bilateral infiltrates seen, left greater than right. Updated:  07/05/19 1854        I have personally looked at the radiology images and I have read the available preliminary report.    Assessment & Plan       -Sepsis that was present on admission (heart rate over 100, acute kidney injury) due to bilateral Legionella pneumonia  -Acute hypoxic respiratory failure due to the Legionella pneumonia  -Acute kidney injury with baseline creatinine 1.11 and admission creatinine 1.89  -Pseudohyponatremia due to hyperglycemia with the corrected sodium level being 135.1 mmol/L  -Type 2 diabetes mellitus  -Remote history of throat cancer in 2007 for which she received radiation treatments, currently with dysphagia  -History of renal cell carcinoma in the past status post right nephrectomy in 2010  -Hypothyroidism  -Past history of extensive tobacco smoking, quit in 2007    Admitted to the critical care unit as I was not sure if he would worsen respiratory rise and need intubating since he does not utilize home oxygen and is currently on 4 L/min; he is now on 6 L/min nasal cannula oxygen.  Since he is high risk with Legionella due to the type 2 diabetes, I have placed him on Zithromax and Rocephin.  Will obtain bedside glucose measurements and will  determine at a later time based on the trend of the glucose levels whether or not we will do sliding scale NovoLog.  On review of the home medications, I do not see the diabetes medicine that the patient has been taking recently.  We will give him scheduled breathing treatments. Will continue the home Synthroid as the TSH is normal.  We will also give IV fluids for the acute kidney injury.  We will continue to monitor the lites closely.  Will obtain a speech therapy consultation as he is choking on his food when he eats.    VTE Prophylaxis:   Mechanical Order History:     None      Pharmalogical Order History:     Ordered     Dose Route Frequency Stop    07/06/19 0153  heparin (porcine) 5000 UNIT/ML injection 5,000 Units      5,000 Units SC Every 12 Hours Scheduled --        The patient is high risk due to the following diagnoses/reasons:  Sepsis and acute hypoxic respiratory failure    Precious Troy MD  [facility] Hospitalist  07/06/19  7:42 AM

## 2019-07-06 NOTE — PLAN OF CARE
Problem: Patient Care Overview  Goal: Plan of Care Review  Outcome: Outcome(s) achieved Date Met: 07/06/19 07/06/19 1123   Coping/Psychosocial   Plan of Care Reviewed With patient;family   Plan of Care Review   Progress improving   OTHER   Outcome Summary Clinical dysphagia assessment w/o overt s/s aspiration. He denies any odynophagia. Recommend least restrictive po diet regular consistency, thin liquids. Medications whole in puree, single pill presentations. No further formal SLP f/u recommended.

## 2019-07-06 NOTE — PROGRESS NOTES
Discharge Planning Assessment  [facility]     Patient Name: Jabier Gonsalves  MRN: [id number]  Today's Date: 7/6/2019    Admit Date: 7/5/2019    Discharge Needs Assessment     Row Name 07/06/19 1040       Living Environment    Name(s) of Who Lives With Patient  Pt states he lives alone and plans to return home at discharge.  His s/o, Carleen Hdz, is at bedside and says she stays with him.       Transition Planning    Patient/Family Anticipates Transition to  home    Transportation Anticipated  family or friend will provide       Discharge Needs Assessment    Readmission Within the Last 30 Days  no previous admission in last 30 days    Equipment Currently Used at Home  glucometer;other (see comments) Pt has glucometer and BP cuff.    Equipment Needed After Discharge  -- If pt qualifies for oxygen at d/c this will be arranged with MD order.    Patient's Choice of Community Agency(s)  Pt lives in [facility] and does not utilize HH services.        Discharge Plan     Row Name 07/06/19 1040       Plan    Plan  Pt plans to return home alone at discharge.  His  s/o, Carleen Hdz, says she stays with him.  He has a glucometer and BP cuff at home.  He denies any need for HH services.  If pt qualifies for oxygen at d/c this will be arranged with MD order.  He sees Amena STANTON, as his PCP.  He has Medicare A&B and gets his Rx filled at  [facility].  He is agreeable to meds to bed.  His son, Jabier, will provide transportation.    Patient/Family in Agreement with Plan  yes    Plan Comments  Pt was admitted with Legionella pneumonia and is currently in CCU.  He says he feels better today and has been able to ambulate some in his room.  He is currently on oxygen at 6 lpm with sats 88-93%.  He is on IV abx and has had TMax 102.4 since admit.  He is being evaluated by SLP and has consult for diabetic educator.  Pt is anxious to go home today.  CM  will follow and assist as needed.       Demographic Summary     Row  Name 07/06/19 1040       General Information    Admission Type  inpatient    Arrived From  emergency department    General Information Comments  Pt says he sees ROMY Acosta, as  his PCP.          Legal     Row Name 07/06/19 1040       Financial/Legal    Finance Comments  Pt has Medicare A&B and gets his Rx filled at [facility].  He is agreeable to meds to bed at discharge.       Xiao Martinez RN

## 2019-07-06 NOTE — THERAPY EVALUATION
"Acute Care - Speech Language Pathology   Swallow Initial Evaluation [facility]   CLINICAL DYSPHAGIA ASSESSMENT     Patient Name: Jabier Gonsalves  : 1955  MRN: [id number]  Today's Date: 2019             Admit Date: 2019     Jabier Gonsalves  is seen at bedside this am on CCU to assess safety/efficacy of swallowing fnx, determine safest/least restrictive diet tolerance. His family is present at bedside for this assessment.     He was admitted 19 from home 2/2 generalized weakness, fatigue, productive cough w/ fever. He is diagnosed w/ bilateral PNA. He has a h/o throat cancer s/p radiation tx in . He reports persistent odynophagia since radiation tx at that time. He denies any h/o recurrent PNA, recurrent bronchitis. Denies any concerns of aspiration, but does report intermittent odynophagia, which he attributes to \"scar tissue in my neck.\"     Social History     Socioeconomic History   • Marital status:      Spouse name: Not on file   • Number of children: Not on file   • Years of education: Not on file   • Highest education level: Not on file   Tobacco Use   • Smoking status: Former Smoker     Packs/day: 2.00     Years: 32.00     Pack years: 64.00     Types: Cigarettes     Start date:      Last attempt to quit: 10/2007     Years since quittin.7   Substance and Sexual Activity   • Sexual activity: Defer      Chest xray 19 revealed L consolidation.     Diet Orders (active) (From admission, onward)    Start     Ordered    19 0154  NPO Diet  Diet Effective Now      19 0153        Observed on NC 6L O2.     Mr. Gonsalves is positioned upright and centered in bed to accept multiple po presentations of ice chips,solid cracker, puree and thin liquids via spoon, cup and straw.  He is able to self feed.     Facial/oral structures are symmetrical upon observation. Lingual protrusion reveals no deviation. Oral mucosa are moist, pink, and clean. Secretions are clear, thin, and well " controlled. OROM/LEELA is wfl to imitate oral postures. Gag is not assessed. Volitional cough is intact w/ adequate  intensity, clear in quality, non-productive. Voice is adequate in intensity, clear in quality w/ intelligible speech. He is noted w/ baseline raspy vocal quality, which he attributes as a secondary effect of h/o radiation for throat CA. He denies any recent vocal quality changes. He is a/a and interactive, oriented, follows directives, and participates in conversational exchanges.     Upon po presentations, adequate bolus anticipation and acceptance w/ good labial seal for bolus clearance via spoon bowl, cup rim stability and suction via straw. Bolus formation, manipulation and control are wfl w/ rotary mastication pattern. A-p transit is timely w/o oral residue. No overt s/s aspiration before the swallow.     Pharyngeal swallow is timely w/ adequate hyolaryngeal elevation per palpation. No overt s/s aspiration evidenced across this evaluation. No silent aspiration suspected as pt is w/o changes in vocal quality, respirations or secretions post po presentations. He denies odynophagia across this assessment. He does report intermittent odynophagia w/ medications specifically. Again, he attributes this as a now baseline effect of radiation effects.     Visit Dx:     ICD-10-CM ICD-9-CM   1. Pneumonia of both lungs due to infectious organism, unspecified part of lung J18.9 483.8   2. Sepsis, due to unspecified organism (CMS/[facility]) A41.9 038.9     995.91   3. Syncope and collapse R55 780.2   4. Acute respiratory failure with hypoxia (CMS/[facility]) J96.01 518.81   5. Legionella pneumonia (CMS/[facility]) A48.1 482.84     Patient Active Problem List   Diagnosis   • Pneumonia of both lungs due to infectious organism   • Hypothyroidism   • Type 2 diabetes mellitus (CMS/[facility])     Past Medical History:   Diagnosis Date   • Hypothyroidism    • Renal cell cancer, right (CMS/[facility]) 2010    Required nephrectomy   • Throat cancer  (CMS/[facility]) 2007    Received radiation treatment   • Type 2 diabetes mellitus (CMS/[facility])      Past Surgical History:   Procedure Laterality Date   • BRONCHOSCOPY  2015   • HERNIA REPAIR     • LYMPH NODE DISSECTION  04/22/2015    Right axillary area   • NEPHRECTOMY RADICAL Right 2010    Due to RCC   • TONSILLECTOMY       EDUCATION  The patient has been educated in the following areas:   Dysphagia (Swallowing Impairment) Oral Care/Hydration.    Impression: Mr. Gonsalves presents w/ wfl oropharyngeal swallow w/o s/s aspiration.No s/s indicative of silent aspiration.  No odynophagia reported across this assessment. He does report odynophagia intermittently, most often occurring w/ medications. He reports this as baseline status, attributes as secondary effect of s/p radiation for neck CA in 2007.     As he has no h/o recurring PNA, denies any vocal quality changes or recurrent bronchitis, Mr. Gonsalves is felt to present w/ wfl oropharyngeal swallow w/o aspiration. He is felt to most likely describe secondary effects of s/p radiation in 2007, specifically w/ known associated globus sensation intermittently.     He is felt to most benefit from least restrictive po diet regular consistency, thin liquids, medications whole in puree/thins, single pill presentations only, please.     SLP Recommendation and Plan    1. Regular consistency diet, thin liquids.    2. Medidcations whole in puree/thins.   3. Upright and centered for all po intake  4. GLORIA precautions.  5. Oral care protocol.  No further formal SLP f/u warranted at this time.    D/w pt and present results and recommendations w/ verbal agreement.    D/w Kelly PRICE, and Dr. Flynn results and recommendations w/ verbal agreement.    Thank you for allowing me to participate in the care of your patient-  Diana Brown M.S., [facility]/SLP                                          Plan of Care Reviewed With: patient, family  Plan of Care Review  Plan of Care Reviewed With: patient,  family  Progress: improving  Outcome Summary: Clinical dysphagia assessment w/o overt s/s aspiration. He denies any odynophagia. Recommend least restrictive po diet regular consistency, thin liquids. Medications whole in puree, single pill presentations. No further formal SLP f/u recommended.            Time Calculation:       Therapy Charges for Today     Code Description Service Date Service Provider Modifiers Qty    [id number]  ST EVAL ORAL PHARYNG SWALLOW 3 7/6/2019 Diana Brown, MS CCC-SLP GN 1               Diana Brown, MS CCC-SLP  7/6/2019

## 2019-07-07 LAB
ANION GAP SERPL CALCULATED.3IONS-SCNC: 13.6 MMOL/L (ref 5–15)
BUN BLD-MCNC: 18 MG/DL (ref 8–23)
BUN/CREAT SERPL: 12.5 (ref 7–25)
CALCIUM SPEC-SCNC: 9.1 MG/DL (ref 8.6–10.5)
CHLORIDE SERPL-SCNC: 102 MMOL/L (ref 98–107)
CO2 SERPL-SCNC: 19.4 MMOL/L (ref 22–29)
CREAT BLD-MCNC: 1.44 MG/DL (ref 0.76–1.27)
CRP SERPL-MCNC: 46.15 MG/DL (ref 0–0.5)
DEPRECATED RDW RBC AUTO: 52.8 FL (ref 37–54)
ERYTHROCYTE [DISTWIDTH] IN BLOOD BY AUTOMATED COUNT: 14.6 % (ref 12.3–15.4)
GFR SERPL CREATININE-BSD FRML MDRD: 50 ML/MIN/1.73
GLUCOSE BLD-MCNC: 300 MG/DL (ref 65–99)
GLUCOSE BLDC GLUCOMTR-MCNC: 268 MG/DL (ref 70–130)
GLUCOSE BLDC GLUCOMTR-MCNC: 274 MG/DL (ref 70–130)
GLUCOSE BLDC GLUCOMTR-MCNC: 293 MG/DL (ref 70–130)
GLUCOSE BLDC GLUCOMTR-MCNC: 320 MG/DL (ref 70–130)
GLUCOSE BLDC GLUCOMTR-MCNC: 360 MG/DL (ref 70–130)
HCT VFR BLD AUTO: 31.9 % (ref 37.5–51)
HGB BLD-MCNC: 10.6 G/DL (ref 13–17.7)
LYMPHOCYTES # BLD MANUAL: 1.17 10*3/MM3 (ref 0.7–3.1)
LYMPHOCYTES NFR BLD MANUAL: 12 % (ref 19.6–45.3)
LYMPHOCYTES NFR BLD MANUAL: 6 % (ref 5–12)
MCH RBC QN AUTO: 32.9 PG (ref 26.6–33)
MCHC RBC AUTO-ENTMCNC: 33.2 G/DL (ref 31.5–35.7)
MCV RBC AUTO: 99.1 FL (ref 79–97)
METAMYELOCYTES NFR BLD MANUAL: 2 % (ref 0–0)
MONOCYTES # BLD AUTO: 0.58 10*3/MM3 (ref 0.1–0.9)
MYELOCYTES NFR BLD MANUAL: 1 % (ref 0–0)
NEUTROPHILS # BLD AUTO: 7.67 10*3/MM3 (ref 1.7–7)
NEUTROPHILS NFR BLD MANUAL: 69 % (ref 42.7–76)
NEUTS BAND NFR BLD MANUAL: 10 % (ref 0–5)
NRBC SPEC MANUAL: 1 /100 WBC (ref 0–0.2)
PLAT MORPH BLD: NORMAL
PLATELET # BLD AUTO: 228 10*3/MM3 (ref 140–450)
PMV BLD AUTO: 10.2 FL (ref 6–12)
POTASSIUM BLD-SCNC: 4.4 MMOL/L (ref 3.5–5.2)
RBC # BLD AUTO: 3.22 10*6/MM3 (ref 4.14–5.8)
RBC MORPH BLD: NORMAL
SCAN SLIDE: NORMAL
SODIUM BLD-SCNC: 135 MMOL/L (ref 136–145)
WBC NRBC COR # BLD: 9.71 10*3/MM3 (ref 3.4–10.8)

## 2019-07-07 PROCEDURE — 25010000002 CEFTRIAXONE: Performed by: INTERNAL MEDICINE

## 2019-07-07 PROCEDURE — 85007 BL SMEAR W/DIFF WBC COUNT: CPT | Performed by: INTERNAL MEDICINE

## 2019-07-07 PROCEDURE — 63710000001 INSULIN ASPART PER 5 UNITS: Performed by: INTERNAL MEDICINE

## 2019-07-07 PROCEDURE — 86140 C-REACTIVE PROTEIN: CPT | Performed by: INTERNAL MEDICINE

## 2019-07-07 PROCEDURE — 80048 BASIC METABOLIC PNL TOTAL CA: CPT | Performed by: INTERNAL MEDICINE

## 2019-07-07 PROCEDURE — 99233 SBSQ HOSP IP/OBS HIGH 50: CPT | Performed by: INTERNAL MEDICINE

## 2019-07-07 PROCEDURE — 82962 GLUCOSE BLOOD TEST: CPT

## 2019-07-07 PROCEDURE — 25010000002 AZITHROMYCIN: Performed by: INTERNAL MEDICINE

## 2019-07-07 PROCEDURE — 25010000002 HEPARIN (PORCINE) PER 1000 UNITS: Performed by: INTERNAL MEDICINE

## 2019-07-07 PROCEDURE — 63710000001 INSULIN DETEMIR PER 5 UNITS: Performed by: INTERNAL MEDICINE

## 2019-07-07 PROCEDURE — 85025 COMPLETE CBC W/AUTO DIFF WBC: CPT | Performed by: INTERNAL MEDICINE

## 2019-07-07 PROCEDURE — 94799 UNLISTED PULMONARY SVC/PX: CPT

## 2019-07-07 RX ORDER — FLUTICASONE PROPIONATE 50 MCG
2 SPRAY, SUSPENSION (ML) NASAL NIGHTLY
Status: DISCONTINUED | OUTPATIENT
Start: 2019-07-07 | End: 2019-07-08 | Stop reason: HOSPADM

## 2019-07-07 RX ORDER — PSEUDOEPHEDRINE HCL 30 MG
30 TABLET ORAL EVERY 8 HOURS PRN
Status: DISCONTINUED | OUTPATIENT
Start: 2019-07-07 | End: 2019-07-08 | Stop reason: HOSPADM

## 2019-07-07 RX ORDER — FLUTICASONE PROPIONATE 50 MCG
2 SPRAY, SUSPENSION (ML) NASAL DAILY
Status: DISCONTINUED | OUTPATIENT
Start: 2019-07-08 | End: 2019-07-07

## 2019-07-07 RX ADMIN — INSULIN ASPART 4 UNITS: 100 INJECTION, SOLUTION INTRAVENOUS; SUBCUTANEOUS at 08:19

## 2019-07-07 RX ADMIN — INSULIN ASPART 4 UNITS: 100 INJECTION, SOLUTION INTRAVENOUS; SUBCUTANEOUS at 11:41

## 2019-07-07 RX ADMIN — HEPARIN SODIUM 5000 UNITS: 5000 INJECTION INTRAVENOUS; SUBCUTANEOUS at 19:51

## 2019-07-07 RX ADMIN — INSULIN ASPART 5 UNITS: 100 INJECTION, SOLUTION INTRAVENOUS; SUBCUTANEOUS at 16:42

## 2019-07-07 RX ADMIN — CEFTRIAXONE 2 G: 2 INJECTION, POWDER, FOR SOLUTION INTRAMUSCULAR; INTRAVENOUS at 08:16

## 2019-07-07 RX ADMIN — SODIUM CHLORIDE, PRESERVATIVE FREE 3 ML: 5 INJECTION INTRAVENOUS at 19:52

## 2019-07-07 RX ADMIN — PSEUDOEPHEDRINE HCL 30 MG: 30 TABLET, FILM COATED ORAL at 21:18

## 2019-07-07 RX ADMIN — AZITHROMYCIN MONOHYDRATE 500 MG: 500 INJECTION, POWDER, LYOPHILIZED, FOR SOLUTION INTRAVENOUS at 22:57

## 2019-07-07 RX ADMIN — HEPARIN SODIUM 5000 UNITS: 5000 INJECTION INTRAVENOUS; SUBCUTANEOUS at 08:23

## 2019-07-07 RX ADMIN — LEVOTHYROXINE SODIUM 150 MCG: 150 TABLET ORAL at 08:17

## 2019-07-07 RX ADMIN — INSULIN ASPART 6 UNITS: 100 INJECTION, SOLUTION INTRAVENOUS; SUBCUTANEOUS at 19:55

## 2019-07-07 RX ADMIN — SODIUM CHLORIDE, PRESERVATIVE FREE 3 ML: 5 INJECTION INTRAVENOUS at 08:19

## 2019-07-07 RX ADMIN — SODIUM CHLORIDE, PRESERVATIVE FREE 3 ML: 5 INJECTION INTRAVENOUS at 08:17

## 2019-07-07 RX ADMIN — CHOLECALCIFEROL TAB 10 MCG (400 UNIT) 2000 UNITS: 10 TAB at 08:16

## 2019-07-07 RX ADMIN — FLUTICASONE PROPIONATE 2 SPRAY: 50 SPRAY, METERED NASAL at 21:19

## 2019-07-07 RX ADMIN — INSULIN DETEMIR 10 UNITS: 100 INJECTION, SOLUTION SUBCUTANEOUS at 19:52

## 2019-07-07 NOTE — PLAN OF CARE
Problem: Patient Care Overview  Goal: Plan of Care Review  Outcome: Ongoing (interventions implemented as appropriate)    Goal: Individualization and Mutuality  Outcome: Ongoing (interventions implemented as appropriate)    Goal: Discharge Needs Assessment  Outcome: Ongoing (interventions implemented as appropriate)    Goal: Interprofessional Rounds/Family Conf  Outcome: Ongoing (interventions implemented as appropriate)      Problem: Fall Risk (Adult)  Goal: Identify Related Risk Factors and Signs and Symptoms  Outcome: Ongoing (interventions implemented as appropriate)    Goal: Absence of Fall  Outcome: Ongoing (interventions implemented as appropriate)      Problem: Infection, Risk/Actual (Adult)  Goal: Identify Related Risk Factors and Signs and Symptoms  Outcome: Ongoing (interventions implemented as appropriate)    Goal: Infection Prevention/Resolution  Outcome: Ongoing (interventions implemented as appropriate)      Problem: Pneumonia (Adult)  Goal: Signs and Symptoms of Listed Potential Problems Will be Absent, Minimized or Managed (Pneumonia)  Outcome: Ongoing (interventions implemented as appropriate)

## 2019-07-07 NOTE — PLAN OF CARE
Problem: Patient Care Overview  Goal: Plan of Care Review  Outcome: Ongoing (interventions implemented as appropriate)    Goal: Discharge Needs Assessment  Outcome: Ongoing (interventions implemented as appropriate)    Goal: Interprofessional Rounds/Family Conf  Outcome: Ongoing (interventions implemented as appropriate)      Problem: Fall Risk (Adult)  Goal: Identify Related Risk Factors and Signs and Symptoms  Outcome: Ongoing (interventions implemented as appropriate)    Goal: Absence of Fall  Outcome: Ongoing (interventions implemented as appropriate)      Problem: Infection, Risk/Actual (Adult)  Goal: Identify Related Risk Factors and Signs and Symptoms  Outcome: Ongoing (interventions implemented as appropriate)    Goal: Infection Prevention/Resolution  Outcome: Ongoing (interventions implemented as appropriate)

## 2019-07-07 NOTE — PROGRESS NOTES
[facility] HOSPITALIST PROGRESS NOTE     Patient Identification:  Name:  Jabier Gonsalves  Age:  63 y.o.  Sex:  male  :  1955  MRN:  [id number]  Visit Number:  [id number]  Primary Care Provider:  Laureen Bernard MD    Length of stay:  2    Chief complaint: Follow-up for syncope and community acquired pneumonia        Subjective:    Patient seen with nursing staff.  Family present at bedside.  No acute issues overnight.  Patient states that he is feeling better.  He is complaining of some cough with minimal sputum production.  Denies any dizziness or lightheadedness.  Denies any other complaints.          Current Hospital Meds:    azithromycin 500 mg Intravenous Q24H   ceftriaxone 2 g Intravenous Q24H   cholecalciferol 2,000 Units Oral Daily   heparin (porcine) 5,000 Units Subcutaneous Q12H   insulin aspart 0-7 Units Subcutaneous 4x Daily AC & at Bedtime   levothyroxine 150 mcg Oral Daily   sodium chloride 3 mL Intravenous Q12H   sodium chloride 3 mL Intravenous Q12H        ----------------------------------------------------------------------------------------------------------------------  Vital Signs:  Temp:  [97.9 °F (36.6 °C)-100.2 °F (37.9 °C)] 97.9 °F (36.6 °C)  Heart Rate:  [] 84  Resp:  [18] 18  BP: (124-156)/(70-81) 136/70      19  0154 19  0605 19  0319   Weight: 94.8 kg (208 lb 14.4 oz) 94.8 kg (208 lb 14.4 oz) 95.5 kg (210 lb 8 oz)     Body mass index is 28.55 kg/m².    Intake/Output Summary (Last 24 hours) at 2019 4463  Last data filed at 2019 1439  Gross per 24 hour   Intake 1570 ml   Output 3400 ml   Net -1830 ml     Diet Regular  ----------------------------------------------------------------------------------------------------------------------  Physical exam:  Constitutional:  Well-developed and well-nourished.   Lying comfortably in bed.  No acute distress.  HENT:  Head:  Normocephalic and atraumatic.  Mouth:  Moist mucous membranes.    Eyes:   Conjunctivae and EOM are normal.  Pupils are equal, round, and reactive to light.   Neck:  Neck supple.  No JVD present.    Cardiovascular:  Regular rate and rhythm. S1+S2. No murmur, rubs or gallops.   Pulmonary/Chest: Few scattered rhonchi and crackles in the bases otherwise clear to auscultation.  Abdominal:  Soft. Non-tender. No viscera palpable.  Bowel sounds audible.   Musculoskeletal: No deformity or joint swelling.   Peripheral vascular: Bilateral dorsalis pedis palpable. No edema.   Neurological:  Alert and oriented to person, place, and time.  Cranial nerves grossly intact. Strength bilaterally symmetrical in upper and lower extremities.   Skin:  Skin is warm and dry. No rash noted. No pallor.   ----------------------------------------------------------------------------------------------------------------------  Tele: Sinus tachycardia  ----------------------------------------------------------------------------------------------------------------------  Results from last 7 days   Lab Units 07/05/19  1837   CK TOTAL U/L 113   TROPONIN T ng/mL <0.010     Results from last 7 days   Lab Units 07/07/19  0031 07/05/19  1837   CRP mg/dL 46.15*  --    LACTATE mmol/L  --  1.2   WBC 10*3/mm3 9.71 10.82*   HEMOGLOBIN g/dL 10.6* 11.6*   HEMATOCRIT % 31.9* 35.4*   MCV fL 99.1* 100.3*   MCHC g/dL 33.2 32.8   PLATELETS 10*3/mm3 228 203     Results from last 7 days   Lab Units 07/05/19  1829   PH, ARTERIAL pH units 7.414   PO2 ART mm Hg 41.4*   PCO2, ARTERIAL mm Hg 31.5*   HCO3 ART mmol/L 19.7*     Results from last 7 days   Lab Units 07/07/19  0031 07/05/19  1837   SODIUM mmol/L 135* 131*   POTASSIUM mmol/L 4.4 5.1   CHLORIDE mmol/L 102 99   CO2 mmol/L 19.4* 18.2*   BUN mg/dL 18 23   CREATININE mg/dL 1.44* 1.89*   EGFR IF NONAFRICN AM mL/min/1.73 50* 36*   CALCIUM mg/dL 9.1 9.4   GLUCOSE mg/dL 300* 273*   ALBUMIN g/dL  --  2.91*   BILIRUBIN mg/dL  --  0.3   ALK PHOS U/L  --  63   AST (SGOT) U/L  --  24   ALT (SGPT) U/L   --  16   Estimated Creatinine Clearance: 64.6 mL/min (A) (by C-G formula based on SCr of 1.44 mg/dL (H)).    No results found for: AMMONIA                    I have personally looked at the labs and they are summarized above.  ----------------------------------------------------------------------------------------------------------------------  Imaging Results (last 24 hours)     ** No results found for the last 24 hours. **        ----------------------------------------------------------------------------------------------------------------------  Assessment and Plan:      -Sepsis due to pneumonia  Improving.  Fever curve has trended down and T-max was 100.2 yesterday evening.  CRP is markedly elevated at 46 and WBC is within normal limits.  Blood pressure and heart rate stable.  No growth on blood culture so far.  Continue Rocephin and azithromycin.  Follow-up on culture results and inflammatory markers.    -Community-acquired pneumonia due to Legionella,   Chest x-ray showing left basilar consolidation.  Continue antibiotics as outlined above.    -Syncope, improved  CT scan of the head showed no acute intracranial abnormality.  Awaiting carotid Doppler and 2D echocardiogram.    -Acute hypoxemic respiratory failure due to pneumonia,  Patient is currently on 5 L/min of O2 via nasal cannula.  Continue supplemental oxygen as needed to keep SPO2 >90%.  Continue duo nebs and Pulmicort nebs.    -Acute kidney injury  Improving.  Creatinine is down to 1.4 today from 1.8 yesterday.  Continue IV fluids. He has history of renal cell carcinoma and right nephrectomy in the past.      -History of throat cancer status post radiation  Patient had a globus sensation in his throat.  He underwent swallow evaluation and showed no evidence of aspiration.  Patient tolerating regular diet.    -Hyperglycemia  Patient continues to have hyperglycemia with blood sugars running from 250-300.  I will check an A1c and start patient on  Levemir.  Continue Accu-Cheks and coverage with sliding scale insulin.    -Hypothyroidism  Continue levothyroxine.    Continue heparin for DVT prophylaxis.      The patient is high risk due to: Syncope, community acquired pneumonia due to Legionella, hypoxemia, hyperglycemia,    I discussed the patients findings and my recommendations with patient, family and nursing staff.    Nikky Flynn MD  07/07/19  2:53 PM

## 2019-07-08 ENCOUNTER — APPOINTMENT (OUTPATIENT)
Dept: ULTRASOUND IMAGING | Facility: HOSPITAL | Age: 64
End: 2019-07-08

## 2019-07-08 ENCOUNTER — APPOINTMENT (OUTPATIENT)
Dept: CARDIOLOGY | Facility: HOSPITAL | Age: 64
End: 2019-07-08

## 2019-07-08 VITALS
RESPIRATION RATE: 18 BRPM | TEMPERATURE: 97.3 F | BODY MASS INDEX: 27.96 KG/M2 | HEART RATE: 92 BPM | HEIGHT: 72 IN | WEIGHT: 206.4 LBS | OXYGEN SATURATION: 96 % | DIASTOLIC BLOOD PRESSURE: 65 MMHG | SYSTOLIC BLOOD PRESSURE: 116 MMHG

## 2019-07-08 LAB
ANION GAP SERPL CALCULATED.3IONS-SCNC: 10.3 MMOL/L (ref 5–15)
BH CV ECHO MEAS - % IVS THICK: -16.5 %
BH CV ECHO MEAS - % LVPW THICK: 14 %
BH CV ECHO MEAS - ACS: 2.2 CM
BH CV ECHO MEAS - AO MAX PG: 9.9 MMHG
BH CV ECHO MEAS - AO MEAN PG: 5 MMHG
BH CV ECHO MEAS - AO ROOT AREA (BSA CORRECTED): 1.7
BH CV ECHO MEAS - AO ROOT AREA: 10.2 CM^2
BH CV ECHO MEAS - AO ROOT DIAM: 3.6 CM
BH CV ECHO MEAS - AO V2 MAX: 157 CM/SEC
BH CV ECHO MEAS - AO V2 MEAN: 98.2 CM/SEC
BH CV ECHO MEAS - AO V2 VTI: 37.2 CM
BH CV ECHO MEAS - BSA(HAYCOCK): 2.2 M^2
BH CV ECHO MEAS - BSA: 2.2 M^2
BH CV ECHO MEAS - BZI_BMI: 27.9 KILOGRAMS/M^2
BH CV ECHO MEAS - BZI_METRIC_HEIGHT: 182.9 CM
BH CV ECHO MEAS - BZI_METRIC_WEIGHT: 93.4 KG
BH CV ECHO MEAS - EDV(CUBED): 99.9 ML
BH CV ECHO MEAS - EDV(MOD-SP4): 77 ML
BH CV ECHO MEAS - EDV(TEICH): 99.3 ML
BH CV ECHO MEAS - EF(CUBED): 75.1 %
BH CV ECHO MEAS - EF(MOD-SP4): 70.1 %
BH CV ECHO MEAS - EF(TEICH): 67 %
BH CV ECHO MEAS - ESV(CUBED): 24.9 ML
BH CV ECHO MEAS - ESV(MOD-SP4): 23 ML
BH CV ECHO MEAS - ESV(TEICH): 32.8 ML
BH CV ECHO MEAS - FS: 37.1 %
BH CV ECHO MEAS - IVS/LVPW: 1
BH CV ECHO MEAS - IVSD: 1.4 CM
BH CV ECHO MEAS - IVSS: 1.1 CM
BH CV ECHO MEAS - LA DIMENSION: 3.6 CM
BH CV ECHO MEAS - LA/AO: 1
BH CV ECHO MEAS - LV DIASTOLIC VOL/BSA (35-75): 35.7 ML/M^2
BH CV ECHO MEAS - LV MASS(C)D: 245.2 GRAMS
BH CV ECHO MEAS - LV MASS(C)DI: 113.6 GRAMS/M^2
BH CV ECHO MEAS - LV MASS(C)S: 123.7 GRAMS
BH CV ECHO MEAS - LV MASS(C)SI: 57.3 GRAMS/M^2
BH CV ECHO MEAS - LV SYSTOLIC VOL/BSA (12-30): 10.7 ML/M^2
BH CV ECHO MEAS - LVIDD: 4.6 CM
BH CV ECHO MEAS - LVIDS: 2.9 CM
BH CV ECHO MEAS - LVLD AP4: 7.4 CM
BH CV ECHO MEAS - LVLS AP4: 5.1 CM
BH CV ECHO MEAS - LVOT AREA (M): 4.5 CM^2
BH CV ECHO MEAS - LVOT AREA: 4.5 CM^2
BH CV ECHO MEAS - LVOT DIAM: 2.4 CM
BH CV ECHO MEAS - LVPWD: 1.3 CM
BH CV ECHO MEAS - LVPWS: 1.5 CM
BH CV ECHO MEAS - MV A MAX VEL: 92.3 CM/SEC
BH CV ECHO MEAS - MV E MAX VEL: 68.6 CM/SEC
BH CV ECHO MEAS - MV E/A: 0.74
BH CV ECHO MEAS - PA ACC SLOPE: 907 CM/SEC^2
BH CV ECHO MEAS - PA ACC TIME: 0.12 SEC
BH CV ECHO MEAS - PA PR(ACCEL): 25 MMHG
BH CV ECHO MEAS - RAP SYSTOLE: 10 MMHG
BH CV ECHO MEAS - RVSP: 42.7 MMHG
BH CV ECHO MEAS - SI(AO): 175.5 ML/M^2
BH CV ECHO MEAS - SI(CUBED): 34.8 ML/M^2
BH CV ECHO MEAS - SI(MOD-SP4): 25 ML/M^2
BH CV ECHO MEAS - SI(TEICH): 30.9 ML/M^2
BH CV ECHO MEAS - SV(AO): 378.7 ML
BH CV ECHO MEAS - SV(CUBED): 75 ML
BH CV ECHO MEAS - SV(MOD-SP4): 54 ML
BH CV ECHO MEAS - SV(TEICH): 66.6 ML
BH CV ECHO MEAS - TR MAX VEL: 286 CM/SEC
BUN BLD-MCNC: 23 MG/DL (ref 8–23)
BUN/CREAT SERPL: 15.9 (ref 7–25)
CALCIUM SPEC-SCNC: 9.5 MG/DL (ref 8.6–10.5)
CHLORIDE SERPL-SCNC: 100 MMOL/L (ref 98–107)
CO2 SERPL-SCNC: 24.7 MMOL/L (ref 22–29)
CREAT BLD-MCNC: 1.45 MG/DL (ref 0.76–1.27)
CRP SERPL-MCNC: 23.9 MG/DL (ref 0–0.5)
DEPRECATED RDW RBC AUTO: 51.9 FL (ref 37–54)
EOSINOPHIL # BLD MANUAL: 0.34 10*3/MM3 (ref 0–0.4)
EOSINOPHIL NFR BLD MANUAL: 4 % (ref 0.3–6.2)
ERYTHROCYTE [DISTWIDTH] IN BLOOD BY AUTOMATED COUNT: 14.5 % (ref 12.3–15.4)
GFR SERPL CREATININE-BSD FRML MDRD: 49 ML/MIN/1.73
GLUCOSE BLD-MCNC: 295 MG/DL (ref 65–99)
GLUCOSE BLDC GLUCOMTR-MCNC: 267 MG/DL (ref 70–130)
GLUCOSE BLDC GLUCOMTR-MCNC: 296 MG/DL (ref 70–130)
HCT VFR BLD AUTO: 33.6 % (ref 37.5–51)
HGB BLD-MCNC: 11.1 G/DL (ref 13–17.7)
LYMPHOCYTES # BLD MANUAL: 1.71 10*3/MM3 (ref 0.7–3.1)
LYMPHOCYTES NFR BLD MANUAL: 20 % (ref 19.6–45.3)
LYMPHOCYTES NFR BLD MANUAL: 9 % (ref 5–12)
MACROCYTES BLD QL SMEAR: ABNORMAL
MAXIMAL PREDICTED HEART RATE: 159 BPM
MCH RBC QN AUTO: 32.6 PG (ref 26.6–33)
MCHC RBC AUTO-ENTMCNC: 33 G/DL (ref 31.5–35.7)
MCV RBC AUTO: 98.5 FL (ref 79–97)
METAMYELOCYTES NFR BLD MANUAL: 2 % (ref 0–0)
MONOCYTES # BLD AUTO: 0.77 10*3/MM3 (ref 0.1–0.9)
NEUTROPHILS # BLD AUTO: 5.55 10*3/MM3 (ref 1.7–7)
NEUTROPHILS NFR BLD MANUAL: 62 % (ref 42.7–76)
NEUTS BAND NFR BLD MANUAL: 3 % (ref 0–5)
PLAT MORPH BLD: NORMAL
PLATELET # BLD AUTO: 275 10*3/MM3 (ref 140–450)
PMV BLD AUTO: 10.2 FL (ref 6–12)
POTASSIUM BLD-SCNC: 4.9 MMOL/L (ref 3.5–5.2)
RBC # BLD AUTO: 3.41 10*6/MM3 (ref 4.14–5.8)
SCAN SLIDE: NORMAL
SODIUM BLD-SCNC: 135 MMOL/L (ref 136–145)
STRESS TARGET HR: 135 BPM
WBC NRBC COR # BLD: 8.54 10*3/MM3 (ref 3.4–10.8)

## 2019-07-08 PROCEDURE — 99239 HOSP IP/OBS DSCHRG MGMT >30: CPT | Performed by: INTERNAL MEDICINE

## 2019-07-08 PROCEDURE — 93306 TTE W/DOPPLER COMPLETE: CPT | Performed by: INTERNAL MEDICINE

## 2019-07-08 PROCEDURE — 93880 EXTRACRANIAL BILAT STUDY: CPT | Performed by: RADIOLOGY

## 2019-07-08 PROCEDURE — 86140 C-REACTIVE PROTEIN: CPT | Performed by: INTERNAL MEDICINE

## 2019-07-08 PROCEDURE — 93306 TTE W/DOPPLER COMPLETE: CPT

## 2019-07-08 PROCEDURE — 93880 EXTRACRANIAL BILAT STUDY: CPT

## 2019-07-08 PROCEDURE — 94799 UNLISTED PULMONARY SVC/PX: CPT

## 2019-07-08 PROCEDURE — 80048 BASIC METABOLIC PNL TOTAL CA: CPT | Performed by: INTERNAL MEDICINE

## 2019-07-08 PROCEDURE — 82962 GLUCOSE BLOOD TEST: CPT

## 2019-07-08 PROCEDURE — 25010000002 CEFTRIAXONE: Performed by: INTERNAL MEDICINE

## 2019-07-08 PROCEDURE — 63710000001 INSULIN ASPART PER 5 UNITS: Performed by: INTERNAL MEDICINE

## 2019-07-08 PROCEDURE — 85025 COMPLETE CBC W/AUTO DIFF WBC: CPT | Performed by: INTERNAL MEDICINE

## 2019-07-08 PROCEDURE — 25010000002 HEPARIN (PORCINE) PER 1000 UNITS: Performed by: INTERNAL MEDICINE

## 2019-07-08 PROCEDURE — 85007 BL SMEAR W/DIFF WBC COUNT: CPT | Performed by: INTERNAL MEDICINE

## 2019-07-08 RX ORDER — CEFUROXIME AXETIL 500 MG/1
500 TABLET ORAL 2 TIMES DAILY
Qty: 14 TABLET | Refills: 0 | Status: SHIPPED | OUTPATIENT
Start: 2019-07-08 | End: 2019-07-15

## 2019-07-08 RX ORDER — L.ACID,PARA/B.BIFIDUM/S.THERM 8B CELL
1 CAPSULE ORAL DAILY
Status: DISCONTINUED | OUTPATIENT
Start: 2019-07-08 | End: 2019-07-08 | Stop reason: HOSPADM

## 2019-07-08 RX ORDER — AZITHROMYCIN 250 MG/1
250 TABLET, FILM COATED ORAL DAILY
Qty: 4 TABLET | Refills: 0 | Status: SHIPPED | OUTPATIENT
Start: 2019-07-08 | End: 2019-07-12

## 2019-07-08 RX ADMIN — HEPARIN SODIUM 5000 UNITS: 5000 INJECTION INTRAVENOUS; SUBCUTANEOUS at 07:31

## 2019-07-08 RX ADMIN — INSULIN ASPART 4 UNITS: 100 INJECTION, SOLUTION INTRAVENOUS; SUBCUTANEOUS at 11:47

## 2019-07-08 RX ADMIN — INSULIN ASPART 4 UNITS: 100 INJECTION, SOLUTION INTRAVENOUS; SUBCUTANEOUS at 07:32

## 2019-07-08 RX ADMIN — CHOLECALCIFEROL TAB 10 MCG (400 UNIT) 2000 UNITS: 10 TAB at 07:33

## 2019-07-08 RX ADMIN — LEVOTHYROXINE SODIUM 150 MCG: 150 TABLET ORAL at 07:32

## 2019-07-08 RX ADMIN — CEFTRIAXONE 2 G: 2 INJECTION, POWDER, FOR SOLUTION INTRAMUSCULAR; INTRAVENOUS at 07:31

## 2019-07-08 NOTE — PLAN OF CARE
Problem: Patient Care Overview  Goal: Individualization and Mutuality  Outcome: Ongoing (interventions implemented as appropriate)    Goal: Discharge Needs Assessment  Outcome: Ongoing (interventions implemented as appropriate)    Goal: Interprofessional Rounds/Family Conf  Outcome: Ongoing (interventions implemented as appropriate)      Problem: Fall Risk (Adult)  Goal: Identify Related Risk Factors and Signs and Symptoms  Outcome: Ongoing (interventions implemented as appropriate)    Goal: Absence of Fall  Outcome: Ongoing (interventions implemented as appropriate)      Problem: Infection, Risk/Actual (Adult)  Goal: Identify Related Risk Factors and Signs and Symptoms  Outcome: Ongoing (interventions implemented as appropriate)    Goal: Infection Prevention/Resolution  Outcome: Ongoing (interventions implemented as appropriate)      Problem: Pneumonia (Adult)  Goal: Signs and Symptoms of Listed Potential Problems Will be Absent, Minimized or Managed (Pneumonia)  Outcome: Ongoing (interventions implemented as appropriate)

## 2019-07-08 NOTE — NURSING NOTE
Called Dr. Troy patient was requesting Sudafed and nose spray to help clear his nose up.  See Orders. patient was on 6 liters NC have been titrating him down patient at this time is on 2 liters NC and Spo2 is 92%.

## 2019-07-08 NOTE — DISCHARGE SUMMARY
[facility] HOSPITALISTS DISCHARGE SUMMARY    Patient Identification:  Name:  Jabier Gonsalves  Age:  63 y.o.  Sex:  male  :  1955  MRN:  [id number]  Visit Number:  [id number]    Date of Admission: 2019  Date of Discharge:  2019     PCP: Laureen Bernard MD    DISCHARGE DIAGNOSIS  -Sepsis due to Legionella pneumonia, positive Legionella antigen  -Syncope, improved  -Acute hypoxemic respiratory failure due to pneumonia, improving, down to 2 L, will try to titrate room air  -Acute kidney injury on CKD stage III, seems to be euvolemic, creatinine 1.4. He has history of renal cell carcinoma and right nephrectomy in the past.  -History of throat cancer status post radiation  -Hyperglycemia with diabetes mellitus type 2  -Hypothyroidism      HOSPITAL COURSE  Patient is a 63 y.o. male presented to [facility] complaining of numbness of breath and episode of loss of consciousness.  Please see the admitting history and physical for further details.  He was found to have sepsis due to Legionella pneumonia and had an episode of syncope.  It is back to his baseline, he is down from oxygen of 6 L to 2 and will be titrated to room air.  He will be discharged home with p.o. azithromycin and Ceftin.  He was asked to follow-up with his primary physician within the next week.  Was noted to be on Actos however with his swelling in the legs as well as the renal cancer patient will be switched to sitagliptin 50 mg a day.    VITAL SIGNS:  Temp:  [97.8 °F (36.6 °C)-98.2 °F (36.8 °C)] 98 °F (36.7 °C)  Heart Rate:  [76-89] 78  Resp:  [18] 18  BP: (125-139)/(67-76) 125/76  SpO2:  [96 %-98 %] 97 %  on  Flow (L/min):  [2-6] 2;   Device (Oxygen Therapy): nasal cannula    Body mass index is 27.99 kg/m².  Wt Readings from Last 3 Encounters:   19 93.6 kg (206 lb 6.4 oz)       PHYSICAL EXAM:  Constitutional:  Well-developed and well-nourished.  No respiratory distress.      HENT:  Head: Normocephalic and  atraumatic.  Mouth:  Moist mucous membranes.    Eyes:  Conjunctivae and EOM are normal.  Pupils are equal, round, and reactive to light.  No scleral icterus.  Neck:  Neck supple.  No JVD present.    Cardiovascular:  Normal rate, regular rhythm and normal heart sounds with no murmur.  Pulmonary/Chest:  No respiratory distress, no wheezes, no crackles, with normal breath sounds and good air movement.  Abdominal:  Soft.  Bowel sounds are normal.  No distension and no tenderness.   Musculoskeletal:  No edema, no tenderness, and no deformity.  No red or swollen joints anywhere.    Neurological:  Alert and oriented to person, place, and time.  No cranial nerve deficit.  No tongue deviation.  No facial droop.  No slurred speech.   Skin:  Skin is warm and dry.  No rash noted.  No pallor.   Peripheral vascular:  No edema and strong pulses on all 4 extremities.      DISCHARGE DISPOSITION   Stable    DISCHARGE MEDICATIONS:     Discharge Medications      New Medications      Instructions Start Date   azithromycin 250 MG tablet  Commonly known as:  ZITHROMAX   250 mg, Oral, Daily, Take 2 tablets the first day, then 1 tablet daily for 4 days.      cefuroxime 500 MG tablet  Commonly known as:  CEFTIN   500 mg, Oral, 2 Times Daily      SITagliptin 100 MG tablet  Commonly known as:  JANUVIA   100 mg, Oral, Daily         Continue These Medications      Instructions Start Date   HYDROcodone-acetaminophen  MG per tablet  Commonly known as:  NORCO   1 tablet, Oral, Every 12 Hours PRN      levothyroxine 150 MCG tablet  Commonly known as:  SYNTHROID, LEVOTHROID   150 mcg, Oral, Daily      Vitamin D3 2000 units tablet   2,000 Units, Oral, Daily         Stop These Medications    pioglitazone 30 MG tablet  Commonly known as:  ACTOS            Diet Instructions     Diet: Consistent Carbohydrate      Discharge Diet:  Consistent Carbohydrate        Activity Instructions     Activity as Tolerated          No future  appointments.    Additional Instructions for the Follow-ups that You Need to Schedule     Discharge Follow-up with PCP   As directed       Currently Documented PCP:    Laureen Bernard MD    PCP Phone Number:    [phone]     Follow Up Details:  PCP in a week           Follow-up Information     Laureen Bernard MD .    Specialty:  Geriatric Medicine  Why:  PCP in a week  Contact information:  [address] [place] [address]  [phone]                    TEST  RESULTS PENDING AT DISCHARGE   Order Current Status    Blood Culture - Blood, Arm, Right Preliminary result    Blood Culture - Blood, Arm, Right Preliminary result           CODE STATUS  Code Status and Medical Interventions:   Ordered at: 07/05/19 2465     Level Of Support Discussed With:    Patient     Code Status:    CPR     Medical Interventions (Level of Support Prior to Arrest):    Full       Mhd Usama Sy MD  07/08/19  2:22 PM    Please note that this discharge summary required more than 30 minutes to complete.    Please send a copy of this dictation to the following providers:  Laureen Bernard MD

## 2019-07-09 ENCOUNTER — READMISSION MANAGEMENT (OUTPATIENT)
Dept: CALL CENTER | Facility: HOSPITAL | Age: 64
End: 2019-07-09

## 2019-07-09 NOTE — OUTREACH NOTE
Prep Survey      Responses   Facility patient discharged from?  [facility]   Is patient eligible?  Yes   Discharge diagnosis  Sepsis due to Legionella pneumonia, positive Legionella antigen   Does the patient have one of the following disease processes/diagnoses(primary or secondary)?  COPD/Pneumonia   Does the patient have Home health ordered?  No   Is there a DME ordered?  No   Prep survey completed?  Yes          Ciara Manjarrez RN

## 2019-07-10 LAB
BACTERIA SPEC AEROBE CULT: NORMAL
BACTERIA SPEC AEROBE CULT: NORMAL

## 2019-07-11 ENCOUNTER — READMISSION MANAGEMENT (OUTPATIENT)
Dept: CALL CENTER | Facility: HOSPITAL | Age: 64
End: 2019-07-11

## 2019-07-11 NOTE — OUTREACH NOTE
COPD/PN Week 1 Survey      Responses   Facility patient discharged from?  [facility]   Does the patient have one of the following disease processes/diagnoses(primary or secondary)?  COPD/Pneumonia   Is there a successful TCM telephone encounter documented?  No   Was the primary reason for admission:  Pneumonia   Week 1 attempt successful?  Yes   Call start time  0747   Call end time  0750   Discharge diagnosis  Sepsis due to Legionella pneumonia, positive Legionella antigen   Is patient permission given to speak with other caregiver?  No   Meds reviewed with patient/caregiver?  Yes   Is the patient having any side effects they believe may be caused by any medication additions or changes?  No   Does the patient have all medications ordered at discharge?  Yes   Is the patient taking all medications as directed (includes completed medication regime)?  Yes   Comments regarding appointments  July 11 at 1045 to see his PCP - He is keeping that appt.    Does the patient have a primary care provider?   Yes   Does the patient have an appointment with their PCP or pulmonologist within 7 days of discharge?  Yes   Has the patient kept scheduled appointments due by today?  Yes   Has home health visited the patient within 72 hours of discharge?  N/A   Psychosocial issues?  No   Did the patient receive a copy of their discharge instructions?  Yes   Nursing interventions  Reviewed instructions with patient   What is the patient's perception of their health status since discharge?  Improving   Nursing Interventions  Nurse provided patient education   Are the patient's immunizations up to date?   Yes   Nursing interventions  Educated on importance of maintaining up to date immunizations as advised by provider   Is the patient/caregiver able to teach back signs and symptoms of worsening condition:  Fever/chills, Shortness of breath, Chest pain   Is the patient/caregiver able to teach back importance of completing antibiotic course of  treatment?  Yes   Week 1 call completed?  Yes          Heidy Valdivia RN

## 2019-07-19 ENCOUNTER — READMISSION MANAGEMENT (OUTPATIENT)
Dept: CALL CENTER | Facility: HOSPITAL | Age: 64
End: 2019-07-19

## 2019-07-19 NOTE — OUTREACH NOTE
COPD/PN Week 2 Survey      Responses   Facility patient discharged from?  [facility]   Does the patient have one of the following disease processes/diagnoses(primary or secondary)?  COPD/Pneumonia   Was the primary reason for admission:  Pneumonia   Week 2 attempt successful?  Yes   Call start time  0802   Call end time  0805   Discharge diagnosis  Sepsis due to Legionella pneumonia, positive Legionella antigen   Meds reviewed with patient/caregiver?  Yes   Is the patient taking all medications as directed (includes completed medication regime)?  Yes   Has the patient kept scheduled appointments due by today?  Yes   What is the patient's perception of their health status since discharge?  Improving   Week 2 call completed?  Yes   Revoked  No further contact(revokes)-requires comment   Graduated/Revoked comments  Goals met, patient does not wish to receive further calls   Wrap up additional comments  Washing his car. Much improved.           Suri Meraz, RN

## 2019-12-09 ENCOUNTER — TRANSCRIBE ORDERS (OUTPATIENT)
Dept: ADMINISTRATIVE | Facility: HOSPITAL | Age: 64
End: 2019-12-09

## 2019-12-09 ENCOUNTER — HOSPITAL ENCOUNTER (OUTPATIENT)
Dept: GENERAL RADIOLOGY | Facility: HOSPITAL | Age: 64
Discharge: HOME | End: 2019-12-09
Admitting: INTERNAL MEDICINE

## 2019-12-09 DIAGNOSIS — M79.601 RIGHT UPPER LIMB PAIN: Primary | ICD-10-CM

## 2019-12-09 DIAGNOSIS — M79.601 RIGHT UPPER LIMB PAIN: ICD-10-CM

## 2019-12-09 PROCEDURE — 72050 X-RAY EXAM NECK SPINE 4/5VWS: CPT

## 2019-12-09 PROCEDURE — 73030 X-RAY EXAM OF SHOULDER: CPT | Performed by: RADIOLOGY

## 2019-12-09 PROCEDURE — 73030 X-RAY EXAM OF SHOULDER: CPT

## 2019-12-09 PROCEDURE — 72052 X-RAY EXAM NECK SPINE 6/>VWS: CPT | Performed by: RADIOLOGY

## 2020-03-25 PROBLEM — R65.20 SEVERE SEPSIS (HCC): Status: ACTIVE | Noted: 2020-03-25

## 2020-03-25 PROBLEM — R56.9 SEIZURE: Status: ACTIVE | Noted: 2020-03-25

## 2020-03-25 PROBLEM — J96.00 ACUTE RESPIRATORY FAILURE: Status: ACTIVE | Noted: 2020-03-25

## 2020-03-25 PROBLEM — J18.9 PNEUMONIA OF BOTH LUNGS DUE TO INFECTIOUS ORGANISM: Status: RESOLVED | Noted: 2019-07-05 | Resolved: 2020-03-25

## 2020-03-25 PROBLEM — J96.02 ACUTE RESPIRATORY FAILURE WITH HYPERCAPNIA: Status: ACTIVE | Noted: 2020-03-25

## 2020-03-25 PROBLEM — T50.901A OVERDOSE: Status: ACTIVE | Noted: 2020-03-25

## 2020-03-25 PROBLEM — A41.9 SEVERE SEPSIS: Status: ACTIVE | Noted: 2020-03-25

## 2020-03-25 PROBLEM — Z79.891 CHRONIC PRESCRIPTION OPIATE USE: Status: ACTIVE | Noted: 2018-09-18

## 2020-03-25 PROBLEM — R41.89 UNRESPONSIVENESS: Status: ACTIVE | Noted: 2020-03-25

## 2020-03-25 PROBLEM — L98.9 SKIN LESION: Status: ACTIVE | Noted: 2018-04-19

## 2022-09-20 NOTE — PLAN OF CARE
Problem: Patient Care Overview  Goal: Plan of Care Review  Outcome: Ongoing (interventions implemented as appropriate)    Goal: Individualization and Mutuality  Outcome: Ongoing (interventions implemented as appropriate)    Goal: Discharge Needs Assessment  Outcome: Ongoing (interventions implemented as appropriate)    Goal: Interprofessional Rounds/Family Conf  Outcome: Ongoing (interventions implemented as appropriate)      Problem: Fall Risk (Adult)  Goal: Identify Related Risk Factors and Signs and Symptoms  Outcome: Ongoing (interventions implemented as appropriate)    Goal: Absence of Fall  Outcome: Ongoing (interventions implemented as appropriate)      Problem: Infection, Risk/Actual (Adult)  Goal: Identify Related Risk Factors and Signs and Symptoms  Outcome: Ongoing (interventions implemented as appropriate)    Goal: Infection Prevention/Resolution  Outcome: Ongoing (interventions implemented as appropriate)      Problem: Pneumonia (Adult)  Goal: Signs and Symptoms of Listed Potential Problems Will be Absent, Minimized or Managed (Pneumonia)  Outcome: Ongoing (interventions implemented as appropriate)         Alert-The patient is alert, awake and responds to voice. The patient is oriented to time, place, and person. The triage nurse is able to obtain subjective information.

## 2023-02-28 ENCOUNTER — TRANSCRIBE ORDERS (OUTPATIENT)
Dept: ADMINISTRATIVE | Facility: HOSPITAL | Age: 68
End: 2023-02-28
Payer: MEDICARE

## 2023-02-28 DIAGNOSIS — R05.3 CHRONIC COUGH: Primary | ICD-10-CM

## 2023-05-18 ENCOUNTER — HOSPITAL ENCOUNTER (OUTPATIENT)
Dept: GENERAL RADIOLOGY | Facility: HOSPITAL | Age: 68
Discharge: HOME | End: 2023-05-18
Admitting: INTERNAL MEDICINE
Payer: MEDICARE

## 2023-05-18 ENCOUNTER — TRANSCRIBE ORDERS (OUTPATIENT)
Dept: ADMINISTRATIVE | Facility: HOSPITAL | Age: 68
End: 2023-05-18
Payer: MEDICARE

## 2023-05-18 DIAGNOSIS — J18.9 UNRESOLVED PNEUMONIA: Primary | ICD-10-CM

## 2023-05-18 DIAGNOSIS — J18.9 UNRESOLVED PNEUMONIA: ICD-10-CM

## 2023-05-18 PROCEDURE — 71046 X-RAY EXAM CHEST 2 VIEWS: CPT | Performed by: RADIOLOGY

## 2023-05-18 PROCEDURE — 71046 X-RAY EXAM CHEST 2 VIEWS: CPT

## 2023-05-26 ENCOUNTER — HOSPITAL ENCOUNTER (OUTPATIENT)
Dept: GENERAL RADIOLOGY | Facility: HOSPITAL | Age: 68
Discharge: HOME | End: 2023-05-26
Payer: MEDICARE

## 2023-05-26 ENCOUNTER — TRANSCRIBE ORDERS (OUTPATIENT)
Dept: ADMINISTRATIVE | Facility: HOSPITAL | Age: 68
End: 2023-05-26

## 2023-05-26 DIAGNOSIS — J18.9 UNRESOLVED PNEUMONIA: ICD-10-CM

## 2023-05-26 DIAGNOSIS — J18.9 UNRESOLVED PNEUMONIA: Primary | ICD-10-CM

## 2023-05-26 PROCEDURE — 71046 X-RAY EXAM CHEST 2 VIEWS: CPT

## 2023-05-26 PROCEDURE — 71046 X-RAY EXAM CHEST 2 VIEWS: CPT | Performed by: RADIOLOGY

## 2023-06-05 ENCOUNTER — HOSPITAL ENCOUNTER (OUTPATIENT)
Dept: GENERAL RADIOLOGY | Facility: HOSPITAL | Age: 68
Discharge: HOME | End: 2023-06-05
Admitting: INTERNAL MEDICINE
Payer: MEDICARE

## 2023-06-05 ENCOUNTER — TRANSCRIBE ORDERS (OUTPATIENT)
Dept: ADMINISTRATIVE | Facility: HOSPITAL | Age: 68
End: 2023-06-05
Payer: MEDICARE

## 2023-06-05 DIAGNOSIS — J18.9 UNRESOLVED PNEUMONIA: ICD-10-CM

## 2023-06-05 DIAGNOSIS — J18.9 UNRESOLVED PNEUMONIA: Primary | ICD-10-CM

## 2023-06-05 PROCEDURE — 71046 X-RAY EXAM CHEST 2 VIEWS: CPT

## 2023-06-05 PROCEDURE — 71046 X-RAY EXAM CHEST 2 VIEWS: CPT | Performed by: RADIOLOGY

## 2023-08-22 ENCOUNTER — TRANSCRIBE ORDERS (OUTPATIENT)
Dept: ADMINISTRATIVE | Facility: HOSPITAL | Age: 68
End: 2023-08-22
Payer: MEDICARE

## 2023-08-22 DIAGNOSIS — Z87.01 HISTORY OF PNEUMONIA: Primary | ICD-10-CM

## 2023-08-23 ENCOUNTER — APPOINTMENT (OUTPATIENT)
Dept: GENERAL RADIOLOGY | Facility: HOSPITAL | Age: 68
End: 2023-08-23
Payer: MEDICARE

## 2023-08-23 ENCOUNTER — HOSPITAL ENCOUNTER (EMERGENCY)
Facility: HOSPITAL | Age: 68
Discharge: HOME | End: 2023-08-23
Attending: STUDENT IN AN ORGANIZED HEALTH CARE EDUCATION/TRAINING PROGRAM
Payer: MEDICARE

## 2023-08-23 VITALS
SYSTOLIC BLOOD PRESSURE: 159 MMHG | HEIGHT: 72 IN | OXYGEN SATURATION: 95 % | WEIGHT: 197 LBS | HEART RATE: 82 BPM | TEMPERATURE: 98.5 F | DIASTOLIC BLOOD PRESSURE: 80 MMHG | RESPIRATION RATE: 17 BRPM | BODY MASS INDEX: 26.68 KG/M2

## 2023-08-23 DIAGNOSIS — M72.2 PLANTAR FASCIITIS: ICD-10-CM

## 2023-08-23 DIAGNOSIS — S90.851A FOREIGN BODY IN RIGHT FOOT, INITIAL ENCOUNTER: ICD-10-CM

## 2023-08-23 DIAGNOSIS — S92.514A CLOSED NONDISPLACED FRACTURE OF PROXIMAL PHALANX OF LESSER TOE OF RIGHT FOOT, INITIAL ENCOUNTER: Primary | ICD-10-CM

## 2023-08-23 PROCEDURE — 99282 EMERGENCY DEPT VISIT SF MDM: CPT

## 2023-08-23 PROCEDURE — 73620 X-RAY EXAM OF FOOT: CPT

## 2023-08-23 PROCEDURE — 73620 X-RAY EXAM OF FOOT: CPT | Performed by: RADIOLOGY

## 2023-08-23 RX ORDER — HYDROCODONE BITARTRATE AND ACETAMINOPHEN 5; 325 MG/1; MG/1
1 TABLET ORAL EVERY 6 HOURS PRN
Qty: 12 TABLET | Refills: 0 | Status: SHIPPED | OUTPATIENT
Start: 2023-08-23

## 2023-08-23 NOTE — ED PROVIDER NOTES
Subjective   History of Present Illness  68 yo male pt presents to the ED with complaints of right foot pain. Pt states that when he gets up in the morning and puts his foot to the floor the sole causes pain. Pt states this has been going on for two months. He denies any injuries. Denies any other symptoms or concerns.  States bearing weight worsens his pain.  No alleviating factors. Pt states he is supposed to go to a car show this weekend and wanted to see if he could find out what is wrong.      History provided by:  Patient   used: No      Review of Systems   Constitutional: Negative.    HENT: Negative.     Eyes: Negative.    Respiratory: Negative.     Cardiovascular: Negative.    Gastrointestinal: Negative.    Endocrine: Negative.    Genitourinary: Negative.    Musculoskeletal:         +R foot pain    Skin: Negative.    Allergic/Immunologic: Negative.    Neurological: Negative.    Hematological: Negative.    Psychiatric/Behavioral: Negative.     All other systems reviewed and are negative.    Past Medical History:   Diagnosis Date    Chronic prescription opiate use 2018    INSPECT verified, on Hydrocodone-acetaminophen  mg PO BID (since 2018)    Drug abuse, by history     Hypothyroidism     Pneumonia of both lungs due to infectious organism 2019    Primary malignant neoplasm of kidney, status post nephrectomy 10/12/2015    Renal cell cancer, right 2010    Required nephrectomy    Throat cancer     Received radiation treatment    Type 2 diabetes mellitus        No Known Allergies    Past Surgical History:   Procedure Laterality Date    BRONCHOSCOPY      HERNIA REPAIR      LYMPH NODE DISSECTION  2015    Right axillary area    NEPHRECTOMY RADICAL Right     Due to RCC    TONSILLECTOMY         Family History   Problem Relation Age of Onset    Cancer Mother         Lung cancer;  at age 80 yo    No Known Problems Father        Social History      Socioeconomic History    Marital status:    Tobacco Use    Smoking status: Former     Packs/day: 2.00     Years: 32.00     Pack years: 64.00     Types: Cigarettes     Start date: 1974     Quit date: 10/2007     Years since quitting: 15.9   Substance and Sexual Activity    Sexual activity: Defer           Objective   Physical Exam  Vitals and nursing note reviewed.   Constitutional:       Appearance: Normal appearance. He is normal weight.   HENT:      Head: Normocephalic and atraumatic.      Right Ear: External ear normal.      Left Ear: External ear normal.      Nose: Nose normal.      Mouth/Throat:      Mouth: Mucous membranes are moist.      Pharynx: Oropharynx is clear.   Eyes:      Extraocular Movements: Extraocular movements intact.      Conjunctiva/sclera: Conjunctivae normal.      Pupils: Pupils are equal, round, and reactive to light.   Cardiovascular:      Rate and Rhythm: Normal rate and regular rhythm.      Pulses: Normal pulses.      Heart sounds: Normal heart sounds.   Pulmonary:      Effort: Pulmonary effort is normal.      Breath sounds: Normal breath sounds.   Abdominal:      General: Abdomen is flat. Bowel sounds are normal.      Palpations: Abdomen is soft.   Musculoskeletal:         General: Normal range of motion.      Cervical back: Normal range of motion and neck supple.        Legs:    Skin:     General: Skin is warm and dry.      Capillary Refill: Capillary refill takes less than 2 seconds.   Neurological:      General: No focal deficit present.      Mental Status: He is alert and oriented to person, place, and time. Mental status is at baseline.   Psychiatric:         Mood and Affect: Mood normal.         Behavior: Behavior normal.         Thought Content: Thought content normal.         Judgment: Judgment normal.       Procedures           ED Course  ED Course as of 08/23/23 1146   Wed Aug 23, 2023   1146 XR Foot 2 View Right  IMPRESSION:  1.  Metallic object identified in the  lateral soft tissues.  2.  Appearance concerning for fracture at the base of the proximal  phalanx of the fifth digit.     This report was finalized on 8/23/2023 11:26 AM by Dr. Hung Lowe MD.           Specimen Collected: 08/23/23 11:25 EDT Last Resulted: 08/23/23 11:26 EDT            [ML]      ED Course User Index  [ML] Carey Hunter PA                                           Medical Decision Making  68 yo male pt presents to the ED with complaints of right foot pain. Pt states that when he gets up in the morning and puts his foot to the floor the sole causes pain. Pt states this has been going on for two months. He denies any injuries. Denies any other symptoms or concerns.  States bearing weight worsens his pain.  No alleviating factors. Pt states he is supposed to go to a car show this weekend and wanted to see if he could find out what is wrong.  PT will f/u with Dr. Jason. PT declined boot for fracture. States he has one at home.  Discussed sx and red flags that would warrant return to the ED.  Education given regarding symptomatic relief.      Problems Addressed:  Closed nondisplaced fracture of proximal phalanx of lesser toe of right foot, initial encounter: complicated acute illness or injury  Foreign body in right foot, initial encounter: complicated acute illness or injury  Plantar fasciitis: complicated acute illness or injury    Amount and/or Complexity of Data Reviewed  Radiology: ordered.        Final diagnoses:   Closed nondisplaced fracture of proximal phalanx of lesser toe of right foot, initial encounter   Plantar fasciitis   Foreign body in right foot, initial encounter       ED Disposition  ED Disposition       ED Disposition   Discharge    Condition   Stable    Comment   --               Laureen Bernard MD  [address] [place] [facility] [address]  [phone]    Schedule an appointment as soon as possible for a visit in 1 day      Joselito Jason, DPM  [address]  [address] [place] [address]  [phone]    Schedule an appointment as soon as possible for a visit in 1 day           Medication List        Stop      HYDROcodone-acetaminophen  MG per tablet  Commonly known as: Carey Zamora PA  08/23/23 1146

## 2023-08-23 NOTE — ED NOTES
MEDICAL SCREENING:    Reason for Visit: Right foot pain    Patient initially seen in triage.  The patient was advised further evaluation and diagnostic testing will be needed, some of the treatment and testing will be initiated in the lobby in order to begin the process.  The patient will be returned to the waiting area for the time being and possibly be re-assessed by a subsequent ED provider.  The patient will be brought back to the treatment area in as timely manner as possible.       Fabio Avery, DO  08/23/23 1105

## 2023-08-24 ENCOUNTER — PATIENT OUTREACH (OUTPATIENT)
Dept: CASE MANAGEMENT | Facility: OTHER | Age: 68
End: 2023-08-24
Payer: MEDICARE

## 2023-08-24 NOTE — OUTREACH NOTE
AMBULATORY CASE MANAGEMENT NOTE    Name and Relationship of Patient/Support Person: Jabier Gonsalves - Self    Patient Outreach    [facility] ASSIGNMENT: 08/24/23  PURPOSE: RN-ACM follow-up for ED visit 08/23/24  INITIAL CHART REVIEW: 08/24/23  MYCHART: Not active    Patient presented at [facility] on 08/23/24 with complaint of foot pain.  Patient was treated and discharged to home to follow as outpatient. Clinical impression noted as closed nondisplaced fracture of proximal phalanx of lesser toe of right foot, plantar fasciitis, and foreign body in right foot. Per review of clinical notes patient indicated he had been experiencing foot pain for approximately two months. After Visit Summary education topics include educational materials for each diagnosis.     Medication changes noted at discharge include: HYDROcodone-Acetaminophen 5-325 MG 1 tablet oral every 6 hours PRN        Recommended follow up: Schedule an appointment with Joselito Jason DPM )Podiatry) in 1 day; Schedule an appointment with Laureen Bernard MD (Geriatric Medicine) in 1 day    -----------    RN-ACM outreach with patient.  AVS, education, discharge medications, and recommended f/u reviewed.  Patient reported little/no improvement in symptoms and voiced intent to call Podiatry this afternoon to schedule an appointment.  No other needs, questions, or concerns for RN-ACM to address were voiced.      Carmen FAIR  Ambulatory Case Management    8/24/2023, 13:31 EDT

## 2023-09-05 ENCOUNTER — TRANSCRIBE ORDERS (OUTPATIENT)
Dept: ADMINISTRATIVE | Facility: HOSPITAL | Age: 68
End: 2023-09-05
Payer: MEDICARE

## 2023-09-05 DIAGNOSIS — Z87.01 HISTORY OF PNEUMONIA: Primary | ICD-10-CM

## 2023-09-19 ENCOUNTER — HOSPITAL ENCOUNTER (OUTPATIENT)
Dept: CT IMAGING | Facility: HOSPITAL | Age: 68
Discharge: HOME | End: 2023-09-19
Admitting: INTERNAL MEDICINE
Payer: MEDICARE

## 2023-09-19 DIAGNOSIS — Z87.01 HISTORY OF PNEUMONIA: ICD-10-CM

## 2023-09-19 PROCEDURE — 71250 CT THORAX DX C-: CPT

## 2023-12-13 ENCOUNTER — TRANSCRIBE ORDERS (OUTPATIENT)
Dept: ADMINISTRATIVE | Facility: HOSPITAL | Age: 68
End: 2023-12-13
Payer: MEDICARE

## 2023-12-13 DIAGNOSIS — R91.8 MULTIPLE PULMONARY NODULES: Primary | ICD-10-CM

## 2023-12-20 ENCOUNTER — HOSPITAL ENCOUNTER (OUTPATIENT)
Dept: CT IMAGING | Facility: HOSPITAL | Age: 68
Discharge: HOME | End: 2023-12-20
Admitting: INTERNAL MEDICINE
Payer: MEDICARE

## 2023-12-20 DIAGNOSIS — R91.8 MULTIPLE PULMONARY NODULES: ICD-10-CM

## 2023-12-20 PROCEDURE — 71250 CT THORAX DX C-: CPT
